# Patient Record
Sex: FEMALE | Race: WHITE
[De-identification: names, ages, dates, MRNs, and addresses within clinical notes are randomized per-mention and may not be internally consistent; named-entity substitution may affect disease eponyms.]

---

## 2017-01-28 ENCOUNTER — HOSPITAL ENCOUNTER (EMERGENCY)
Dept: HOSPITAL 75 - ER | Age: 42
Discharge: HOME | End: 2017-01-28
Payer: COMMERCIAL

## 2017-01-28 VITALS — DIASTOLIC BLOOD PRESSURE: 84 MMHG | SYSTOLIC BLOOD PRESSURE: 132 MMHG

## 2017-01-28 VITALS — BODY MASS INDEX: 41.65 KG/M2 | WEIGHT: 250 LBS | HEIGHT: 65 IN

## 2017-01-28 DIAGNOSIS — N13.2: Primary | ICD-10-CM

## 2017-01-28 DIAGNOSIS — N83.202: ICD-10-CM

## 2017-01-28 LAB
ALBUMIN SERPL-MCNC: 4 G/DL (ref 3.2–4.5)
ALT SERPL-CCNC: 22 U/L (ref 0–55)
ANION GAP SERPL CALC-SCNC: 13 MMOL/L (ref 5–14)
AST SERPL-CCNC: 17 U/L (ref 5–34)
BASOPHILS # BLD AUTO: 0.1 10^3/UL (ref 0–0.1)
BASOPHILS NFR BLD AUTO: 0 % (ref 0–10)
BASOPHILS NFR BLD MANUAL: 0 %
BILIRUB SERPL-MCNC: 0.3 MG/DL (ref 0.1–1)
BILIRUB UR QL STRIP: (no result)
BUN SERPL-MCNC: 12 MG/DL (ref 7–18)
BUN/CREAT SERPL: 11
CALCIUM SERPL-MCNC: 9.1 MG/DL (ref 8.5–10.1)
CAOX CRY #/AREA URNS LPF: (no result) /LPF
CHLORIDE SERPL-SCNC: 104 MMOL/L (ref 98–107)
CO2 SERPL-SCNC: 21 MMOL/L (ref 21–32)
CREAT SERPL-MCNC: 1.12 MG/DL (ref 0.6–1.3)
EOSINOPHIL # BLD AUTO: 0.2 10^3/UL (ref 0–0.3)
EOSINOPHIL NFR BLD AUTO: 1 % (ref 0–10)
EOSINOPHIL NFR BLD MANUAL: 1 %
ERYTHROCYTE [DISTWIDTH] IN BLOOD BY AUTOMATED COUNT: 14.4 % (ref 10–14.5)
GFR SERPLBLD BASED ON 1.73 SQ M-ARVRAT: 53 ML/MIN
GLUCOSE SERPL-MCNC: 109 MG/DL (ref 70–105)
HS C REACTIVE PROTEIN: 4.26 MG/DL (ref 0–0.5)
KETONES UR QL STRIP: (no result)
LEUKOCYTE ESTERASE UR QL STRIP: (no result)
LIPASE SERPL-CCNC: 14 U/L (ref 8–78)
LYMPHOCYTES # BLD AUTO: 2.5 X 10^3 (ref 1–4)
LYMPHOCYTES NFR BLD AUTO: 16 % (ref 12–44)
MCH RBC QN AUTO: 28 PG (ref 25–34)
MCHC RBC AUTO-ENTMCNC: 34 G/DL (ref 32–36)
MCV RBC AUTO: 82 FL (ref 80–99)
MONOCYTES # BLD AUTO: 1 X 10^3 (ref 0–1)
MONOCYTES NFR BLD AUTO: 7 % (ref 0–12)
NEUTROPHILS # BLD AUTO: 11.3 X 10^3 (ref 1.8–7.8)
NEUTROPHILS NFR BLD AUTO: 76 % (ref 42–75)
NEUTS BAND NFR BLD MANUAL: 70 %
NEUTS BAND NFR BLD: 0 %
NITRITE UR QL STRIP: NEGATIVE
PH UR STRIP: 6.5 [PH] (ref 5–9)
PLATELET # BLD: 381 10^3/UL (ref 130–400)
PMV BLD AUTO: 9.5 FL (ref 7.4–10.4)
POTASSIUM SERPL-SCNC: 3.8 MMOL/L (ref 3.6–5)
PROT SERPL-MCNC: 7.3 G/DL (ref 6.4–8.2)
PROT UR QL STRIP: (no result)
RBC # BLD AUTO: 4.55 10^6/UL (ref 4.35–5.85)
SODIUM SERPL-SCNC: 138 MMOL/L (ref 135–145)
SP GR UR STRIP: 1.02 (ref 1.02–1.02)
SQUAMOUS #/AREA URNS HPF: (no result) /HPF
UROBILINOGEN UR-MCNC: NORMAL MG/DL
VARIANT LYMPHS NFR BLD MANUAL: 25 %
WBC # BLD AUTO: 15 10^3/UL (ref 4.3–11)
WBC #/AREA URNS HPF: (no result) /HPF

## 2017-01-28 PROCEDURE — 85007 BL SMEAR W/DIFF WBC COUNT: CPT

## 2017-01-28 PROCEDURE — 96374 THER/PROPH/DIAG INJ IV PUSH: CPT

## 2017-01-28 PROCEDURE — 96361 HYDRATE IV INFUSION ADD-ON: CPT

## 2017-01-28 PROCEDURE — 74177 CT ABD & PELVIS W/CONTRAST: CPT

## 2017-01-28 PROCEDURE — 36415 COLL VENOUS BLD VENIPUNCTURE: CPT

## 2017-01-28 PROCEDURE — 85027 COMPLETE CBC AUTOMATED: CPT

## 2017-01-28 PROCEDURE — 86141 C-REACTIVE PROTEIN HS: CPT

## 2017-01-28 PROCEDURE — 87088 URINE BACTERIA CULTURE: CPT

## 2017-01-28 PROCEDURE — 81000 URINALYSIS NONAUTO W/SCOPE: CPT

## 2017-01-28 PROCEDURE — 96375 TX/PRO/DX INJ NEW DRUG ADDON: CPT

## 2017-01-28 PROCEDURE — 83690 ASSAY OF LIPASE: CPT

## 2017-01-28 PROCEDURE — 96376 TX/PRO/DX INJ SAME DRUG ADON: CPT

## 2017-01-28 PROCEDURE — 74000: CPT

## 2017-01-28 PROCEDURE — 80053 COMPREHEN METABOLIC PANEL: CPT

## 2017-01-28 PROCEDURE — 84703 CHORIONIC GONADOTROPIN ASSAY: CPT

## 2017-01-28 NOTE — DIAGNOSTIC IMAGING REPORT
INDICATION:  Left lower quadrant pain.



TECHNIQUE:  Single supine view of the abdomen 3:19 AM



CORRELATION STUDY:  None



FINDINGS:

There is nonvisualization of contrast within the right ureter;

however, contrast over the kidney demonstrates no significant

caliectasis. Left collecting system unremarkable. Urinary

bladder unremarkable. Overlying bowel gas pattern has a

nonobstructive appearance. Clips in the right upper quadrant

compatible with postcholecystectomy clips. Additional clip in

the right mid abdomen superimposed over the ascending colon.





IMPRESSION:

Nonvisualization of the right ureter on postcontrast imaging,

however, no significant caliectasis. Left collecting system

unremarkable. Mild severity fecal retention with

nonobstructive-appearing bowel gas pattern.



Dictated by:



Dictated on workstation # [id number]

## 2017-01-28 NOTE — ED ABDOMINAL PAIN
General


Chief Complaint:  -Female


Stated Complaint:  BLADDER PAIN


Nursing Triage Note:  


PELVIC PAIN RADIATING TO LEFT FLANK/BACK. WORSE TONIGHT


Sepsis Screen:  No Definite Risk


Source of Information:  Patient


Exam Limitations:  No Limitations





History of Present Illness


Time Seen By Provider:  00:22


Initial Comments


This 41-year-old woman presents to the emergency room with left-sided pelvic 

and flank pain that started about one month ago.  It was more mild in nature 

until the last few hours when it became severe.  She is in obvious distress.  

She has been on antibiotics for about one month for urinary tract infection she 

has also been on Hyophen for bladder spasms.  She denies any bowel problems.  

She has mild nausea without vomiting.  She has urinary frequency.  No fever.  

Her primary care provider is Dr. Soler.  She denies sexual activity.





Allergies and Home Medications


Allergies


Coded Allergies:  


     No Known Drug Allergies (Unverified , 3/1/11)





Home Medications


Ciprofloxacin HCl 250 Mg Tablet #28 1 CAP PO BID (Reported) 


Methenam/Me Blue/Ba/Salicy/Hyo 1 Each Tablet 1 EACH PO UD (Reported) 


Oxycodone HCl/Acetaminophen 1 Each Tablet #30 1-2 EACH PO Q6H PRN PRN PAIN 


   Prescribed by: SHANNAN WERNER on 1/28/17 0248


Phenazopyridine HCl 200 Mg Tablet #10 1 TAB PO TID PRN PRN PAIN 


   Prescribed by: SHANNAN WERNER on 1/28/17 0248





Review of Systems


Constitutional:   no symptoms reported


EENTM:   No Symptoms Reported


Respiratory:   No Symptoms Reported


Cardiovascular:   No Symptoms Reported


Gastrointestinal:   See HPI


Genitourinary:   See HPI


Musculoskeletal:   no symptoms reported


Skin:   no symptoms reported


Psychiatric/Neurological:   No Symptoms Reported


Endocrine:   No Symptoms Reported





Past Medical-Social-Family Hx


Patient Social History


Alcohol Use:  Occasionally Uses


Recreational Drug Use:  No


Smoking Status:  Never a Smoker


Recent Foreign Travel:  No


Contact w/Someone Who Travel:  No


Recent Infectious Disease Expo:  No


Recent Hopitalizations:  No


Physical Abuse Screen:  No


Sexual Abuse:  No





Immunizations Up To Date


Tetanus Booster (TDap):  Unknown





Seasonal Allergies


Seasonal Allergies:  No





Surgeries


HX Surgeries:  Yes (BACK)


Surgeries:  Gallbladder, Orthopedic





Respiratory


Hx Respiratory Disorders:  No





Cardiovascular


Hx Cardiac Disorders:  No





Neurological


Hx Neurological Disorders:  No





Reproductive System


Pregnant:  No


Hx Reproductive Disorders:  No





Genitourinary


Hx Genitourinary Disorders:  No





Gastrointestinal


Hx Gastrointestinal Disorders:  No





Musculoskeletal


Hx Musculoskeletal Disorders:  No





Endocrine


Hx Endocrine Disorders:  Yes (obesity)





HEENT


HX ENT Disorders:  No





Cancer


Hx Cancer:  No





Psychosocial


Hx Psychiatric Problems:  No





Integumentary


HX Skin/Integumentary Disorder:  No





Blood Transfusions


Hx Blood Disorders:  No





Family Medical History


Significant Family History:  COPD, Hypertension





Physical Exam


Vital Signs





 VS - Last 72 Hours, by Label








 1/28/17





 00:29


 


Temp 99.7


 


Pulse 115


 


Resp 24


 


B/P 160/96


 


Pulse Ox 99


 


O2 Delivery Room Air





Capillary Refill : Less Than 3 Seconds


General Appearance:   WD/WN moderate distress


HEENT:   PERRL/EOMI normal ENT inspection


Neck:   normal inspection


Respiratory:   lungs clear normal breath sounds no respiratory distress no 

accessory muscle use


Cardiovascular:   no edema no murmur tachycardia


Gastrointestinal:   normal bowel sounds soft tenderness (throughout the left 

abdomen)


Extremities:   normal inspection no pedal edema


Back:   normal inspection no CVA tenderness


Neurologic/Psychiatric:   CNs II-XII nml as tested no motor/sensory deficits 

alert normal mood/affect oriented x 3


Skin:   normal color warm/dry





Progress/Results/Core Measures


Results/Orders


Lab Results





 Laboratory Tests








Test


  1/28/17


00:25 1/28/17


01:07 Range/Units


 


 


Urine Bacteria TRACE    /HPF


 


Urine Bilirubin 1+ H  NEGATIVE  


 


Urine Calcium Oxalate Crystals MODERATE H   /LPF


 


Urine Casts NONE    /LPF


 


Urine Clarity CLEAR    


 


Urine Color OTHER H   


 


Urine Crystals PRESENT H   /LPF


 


Urine Culture Indicated YES    


 


Urine Glucose (UA) NEGATIVE   NEGATIVE  


 


Urine Ketones 1+ H  NEGATIVE  


 


Urine Leukocyte Esterase 1+ H  NEGATIVE  


 


Urine Mucus NEGATIVE    /LPF


 


Urine Nitrite NEGATIVE   NEGATIVE  


 


Urine Protein 2+ H  NEGATIVE  


 


Urine RBC NONE    /HPF


 


Urine RBC (Auto) 2+ H  NEGATIVE  


 


Urine Specific Gravity 1.025 H  1.016-1.022  


 


Urine Squamous Epithelial


Cells 5-10 


  


   /HPF


 


 


Urine Urobilinogen NORMAL   NORMAL  MG/DL


 


Urine WBC NONE    /HPF


 


Urine pH 6.5   5-9  


 


Alanine Aminotransferase


(ALT/SGPT) 


  22 


  0-55  U/L


 


 


Albumin  4.0  3.2-4.5  G/DL


 


Alkaline Phosphatase  76    U/L


 


Anion Gap  13  5-14  MMOL/L


 


Aspartate Amino Transf


(AST/SGOT) 


  17 


  5-34  U/L


 


 


BUN/Creatinine Ratio  11   


 


Band Neutrophils  0   %


 


Basophils # (Auto)


  


  0.1 


  0.0-0.1


10^3/uL


 


Basophils % (Manual)  0   %


 


Basophils (%) (Auto)  0  0-10  %


 


Blood Morphology Comment  NORMAL   


 


Blood Urea Nitrogen  12  7-18  MG/DL


 


C-Reactive Protein High


Sensitivity 


  4.26 H


  0.00-0.50


MG/DL


 


Calcium Level  9.1  8.5-10.1  MG/DL


 


Carbon Dioxide Level  21  21-32  MMOL/L


 


Chloride Level  104    MMOL/L


 


Creatinine


  


  1.12 


  0.60-1.30


MG/DL


 


Eosinophils # (Auto)


  


  0.2 


  0.0-0.3


10^3/uL


 


Eosinophils % (Manual)  1   %


 


Eosinophils (%) (Auto)  1  0-10  %


 


Estimat Glomerular Filtration


Rate 


  53 


   


 


 


Glucose Level  109 H   MG/DL


 


Hematocrit  37  35-52  %


 


Hemoglobin  12.7  11.5-16.0  G/DL


 


Lipase  14  8-78  U/L


 


Lymphocytes # (Auto)  2.5  1.0-4.0  X 10^3


 


Lymphocytes % (Manual)  25   %


 


Lymphocytes (%) (Auto)  16  12-44  %


 


Mean Corpuscular Hemoglobin  28  25-34  PG


 


Mean Corpuscular Hemoglobin


Concent 


  34 


  32-36  G/DL


 


 


Mean Corpuscular Volume  82  80-99  FL


 


Mean Platelet Volume  9.5  7.4-10.4  FL


 


Monocytes # (Auto)  1.0  0.0-1.0  X 10^3


 


Monocytes % (Manual)  4   %


 


Monocytes (%) (Auto)  7  0-12  %


 


Neutrophils # (Auto)  11.3 H 1.8-7.8  X 10^3


 


Neutrophils % (Manual)  70   %


 


Neutrophils (%) (Auto)  76 H 42-75  %


 


Platelet Count


  


  381 


  130-400


10^3/uL


 


Potassium Level  3.8  3.6-5.0  MMOL/L


 


Red Blood Count


  


  4.55 


  4.35-5.85


10^6/uL


 


Red Cell Distribution Width  14.4  10.0-14.5  %


 


Sodium Level  138  135-145  MMOL/L


 


Total Bilirubin  0.3  0.1-1.0  MG/DL


 


Total Protein  7.3  6.4-8.2  G/DL


 


White Blood Count


  


  15.0 H


  4.3-11.0


10^3/uL








My Orders





 Orders-SHANNAN DAVID MD


Ua Culture If Indicated (1/28/17 00:22)


Urine Culture (1/28/17 00:25)


Cbc With Automated Diff (1/28/17 00:53)


Comprehensive Metabolic Panel (1/28/17 00:53)


Hs C Reactive Protein (1/28/17 00:53)


Lipase (1/28/17 00:53)


Saline Lock/Iv-Start (1/28/17 00:53)


Ns Iv 1000 Ml (Sodium Chloride 0.9%) (1/28/17 00:53)


Fentanyl  Injection (Sublimaze Injection (1/28/17 01:00)


Ondansetron Injection (Zofran Injectio (1/28/17 01:00)


Ct Abdomen/Pelvis W (1/28/17 00:57)


Hydromorphone Injection (Dilaudid Inject (1/28/17 01:09)


Manual Differential (1/28/17 01:07)


Iohexol Injection (Omnipaque 350 Mg/Ml 1 (1/28/17 01:45)


Ns (Ivpb) (Sodium Chloride 0.9% Ivpb Bag (1/28/17 01:45)


Ketorolac Injection (Toradol Injection) (1/28/17 02:00)


Phenazopyridine Tablet (Pyridium Tablet) (1/28/17 02:45)


Oxycodone/Apap 5/325mg Tablet (Percocet (1/28/17 02:45)


Abdomen/Kub 1view (1/28/17 02:49)





Medications Given in ED





 Current Medications








 Medications  Dose


 Ordered  Sig/Quoc


 Route  Start Time


 Stop Time Status Last Admin


Dose Admin


 


 Fentanyl Citrate  100 mcg  ONCE  ONCE


 IVP  1/28/17 01:00


 1/28/17 01:01 DC 1/28/17 01:05


100 MCG


 


 Ketorolac


 Tromethamine  30 mg  ONCE  ONCE


 IVP  1/28/17 02:00


 1/28/17 02:01 DC 1/28/17 02:00


30 MG


 


 Ondansetron HCl  4 mg  ONCE  ONCE


 IVP  1/28/17 01:00


 1/28/17 01:01 DC 1/28/17 01:05


4 MG


 


 Oxycodone/


 Acetaminophen  1 tab  ONCE  ONCE


 PO  1/28/17 02:45


 1/28/17 02:46 DC 1/28/17 02:53


1 TAB


 


 Phenazopyridine


 HCl  200 mg  ONCE  ONCE


 PO  1/28/17 02:45


 1/28/17 02:46 DC 1/28/17 02:52


200 MG


 


 Sodium Chloride  1,000 ml @ 


 0 mls/hr  Q0M ONCE


 IV  1/28/17 00:53


 1/28/17 00:56 DC 1/28/17 01:05


0 MLS/HR








Vital Signs/I&O





 Vital Sign - Last 12Hours








 1/28/17





 00:29


 


Temp 99.7


 


Pulse 115


 


Resp 24


 


B/P 160/96


 


Pulse Ox 99


 


O2 Delivery Room Air








Blood Pressure Mean:  117





Point of Care Testing


Urine Pregnancy-Bedside:  Negative


Progress Note #1:  


   Time:  01:37


Progress Note


Patient is now on CT for evaluation of her abdominal pain.  She was initially 

treated with fentanyl 100 g which was insufficient for treatment of her pain.  

She was still in distress from pain.  Dilaudid 1 mg was added.  Zofran was 

administered for nausea.  IV fluids are infusing.


Progress Note #2:  


   Time:  03:02


Progress Note


Patient was found to have a small left distal ureteral stone by CT scan.  

Toradol was added to her pain management and she has comfortable.  A ring-

enhancing ovarian cyst was also noted on the left.  Patient is aware of the 

cyst and she was encouraged to follow up with her primary care provider to 

monitor stability.  Patient was given Percocet and Pyridium prior to dismissal 

to help her get through the night.  A strainer was provided.  She already has 

an appointment with Dr. Tawil for Tuesday.





Diagnostic Imaging





   Diagonstic Imaging:  CT


   Plain Films/CT/US/NM/MRI:  abdomen, pelvis


Comments


CT abdomen and pelvis viewed by me and stat rad report reviewed.  There is a 

small distal left ureteral stone measuring approximately 3 x 4 mm with 

associated left hydroureter and hydronephrosis.  Ring-enhancing left ovarian 

cyst was also noted.





Departure


Impression


Impression:  


 Primary Impression:  


 Left ureteral stone


 Additional Impressions:  


 Left sided abdominal pain


 Ovarian cyst, left


Disposition:  01 HOME, SELF-CARE


Condition:  Improved





Departure-Patient Inst.


Decision time for Depature:  02:40


Referrals:  


KIRSTIE SOLER DO (PCP/Family)


Primary Care Physician


Patient Instructions:  Kidney Stones in Adults





Add. Discharge Instructions:


Complete your antibiotics as prescribed.  Drink plenty of clear liquids.  You 

may discontinue Hyophen.  Use Percocet and peridium as prescribed.  Keep your 

appointment with Dr. Tawil.  Return to the emergency room if symptoms worsen.  

Please follow-up with your primary care provider or women's health provider 

regarding your ovarian cyst.  It needs to be monitored for stability.




















All discharge instructions reviewed with patient and/or family. Voiced 

understanding.


Scripts


Phenazopyridine HCl (Pyridium)200 Mg Tablet1 Tab PO TID PRN PAIN #10 TAB


   Prov:SHANNAN DAVID MD         1/28/17


Oxycodone HCl/Acetaminophen (Percocet 5-325 mg Tablet)1 Each Tablet1-2 Each PO 

Q6H PRN PAIN #30 TAB


   Prov:SHANNAN DAVID MD         1/28/17





Copy


Copies To 1:   TAWIL,ELIAS A MD


Copies To 2:   KIRSTIE SOLER JOSHUA T MD Jan 28, 2017 01:36

## 2017-02-09 ENCOUNTER — HOSPITAL ENCOUNTER (EMERGENCY)
Dept: HOSPITAL 75 - ER | Age: 42
Discharge: HOME | End: 2017-02-09
Payer: COMMERCIAL

## 2017-02-09 ENCOUNTER — HOSPITAL ENCOUNTER (OUTPATIENT)
Dept: HOSPITAL 75 - PREOP | Age: 42
Discharge: HOME | End: 2017-02-09
Attending: UROLOGY
Payer: COMMERCIAL

## 2017-02-09 VITALS — WEIGHT: 250 LBS | BODY MASS INDEX: 41.65 KG/M2 | HEIGHT: 65 IN

## 2017-02-09 VITALS — HEIGHT: 65 IN | BODY MASS INDEX: 48.48 KG/M2 | WEIGHT: 291 LBS

## 2017-02-09 VITALS — DIASTOLIC BLOOD PRESSURE: 66 MMHG | SYSTOLIC BLOOD PRESSURE: 119 MMHG

## 2017-02-09 VITALS — DIASTOLIC BLOOD PRESSURE: 68 MMHG | SYSTOLIC BLOOD PRESSURE: 125 MMHG

## 2017-02-09 DIAGNOSIS — N20.1: ICD-10-CM

## 2017-02-09 DIAGNOSIS — Z01.812: Primary | ICD-10-CM

## 2017-02-09 DIAGNOSIS — D25.9: ICD-10-CM

## 2017-02-09 DIAGNOSIS — N20.1: Primary | ICD-10-CM

## 2017-02-09 DIAGNOSIS — Z11.2: ICD-10-CM

## 2017-02-09 DIAGNOSIS — N83.202: ICD-10-CM

## 2017-02-09 LAB
ALBUMIN SERPL-MCNC: 3.9 G/DL (ref 3.2–4.5)
ALT SERPL-CCNC: 30 U/L (ref 0–55)
ANION GAP SERPL CALC-SCNC: 8 MMOL/L (ref 5–14)
AST SERPL-CCNC: 18 U/L (ref 5–34)
BASOPHILS # BLD AUTO: 0.1 10^3/UL (ref 0–0.1)
BASOPHILS NFR BLD AUTO: 1 % (ref 0–10)
BILIRUB SERPL-MCNC: 0.4 MG/DL (ref 0.1–1)
BILIRUB UR QL STRIP: NEGATIVE
BUN SERPL-MCNC: 10 MG/DL (ref 7–18)
BUN/CREAT SERPL: 10
CALCIUM SERPL-MCNC: 8.8 MG/DL (ref 8.5–10.1)
CHLORIDE SERPL-SCNC: 105 MMOL/L (ref 98–107)
CO2 SERPL-SCNC: 24 MMOL/L (ref 21–32)
CREAT SERPL-MCNC: 1 MG/DL (ref 0.6–1.3)
EOSINOPHIL # BLD AUTO: 0.1 10^3/UL (ref 0–0.3)
EOSINOPHIL NFR BLD AUTO: 1 % (ref 0–10)
ERYTHROCYTE [DISTWIDTH] IN BLOOD BY AUTOMATED COUNT: 14.5 % (ref 10–14.5)
GFR SERPLBLD BASED ON 1.73 SQ M-ARVRAT: > 60 ML/MIN
GLUCOSE SERPL-MCNC: 112 MG/DL (ref 70–105)
KETONES UR QL STRIP: NEGATIVE
LEUKOCYTE ESTERASE UR QL STRIP: (no result)
LYMPHOCYTES # BLD AUTO: 2.2 X 10^3 (ref 1–4)
LYMPHOCYTES NFR BLD AUTO: 22 % (ref 12–44)
MCH RBC QN AUTO: 28 PG (ref 25–34)
MCHC RBC AUTO-ENTMCNC: 34 G/DL (ref 32–36)
MCV RBC AUTO: 82 FL (ref 80–99)
MONOCYTES # BLD AUTO: 0.8 X 10^3 (ref 0–1)
MONOCYTES NFR BLD AUTO: 8 % (ref 0–12)
NEUTROPHILS # BLD AUTO: 6.9 X 10^3 (ref 1.8–7.8)
NEUTROPHILS NFR BLD AUTO: 68 % (ref 42–75)
NITRITE UR QL STRIP: NEGATIVE
PH UR STRIP: 5 [PH] (ref 5–9)
PHOSPHATE SERPL-MCNC: 3.1 MG/DL (ref 2.3–4.7)
PLATELET # BLD: 344 10^3/UL (ref 130–400)
PMV BLD AUTO: 9.1 FL (ref 7.4–10.4)
POTASSIUM SERPL-SCNC: 3.7 MMOL/L (ref 3.6–5)
PROT SERPL-MCNC: 7.3 G/DL (ref 6.4–8.2)
PROT UR QL STRIP: (no result)
RBC # BLD AUTO: 4.55 10^6/UL (ref 4.35–5.85)
SODIUM SERPL-SCNC: 137 MMOL/L (ref 135–145)
SP GR UR STRIP: 1.02 (ref 1.02–1.02)
SQUAMOUS #/AREA URNS HPF: (no result) /HPF
URATE SERPL-MCNC: 6.5 MG/DL (ref 2.6–7.2)
UROBILINOGEN UR-MCNC: NORMAL MG/DL
WBC # BLD AUTO: 10.1 10^3/UL (ref 4.3–11)
WBC #/AREA URNS HPF: (no result) /HPF

## 2017-02-09 PROCEDURE — 84100 ASSAY OF PHOSPHORUS: CPT

## 2017-02-09 PROCEDURE — 87088 URINE BACTERIA CULTURE: CPT

## 2017-02-09 PROCEDURE — 81000 URINALYSIS NONAUTO W/SCOPE: CPT

## 2017-02-09 PROCEDURE — 83970 ASSAY OF PARATHORMONE: CPT

## 2017-02-09 PROCEDURE — 84550 ASSAY OF BLOOD/URIC ACID: CPT

## 2017-02-09 PROCEDURE — 36415 COLL VENOUS BLD VENIPUNCTURE: CPT

## 2017-02-09 PROCEDURE — 74000: CPT

## 2017-02-09 PROCEDURE — 87081 CULTURE SCREEN ONLY: CPT

## 2017-02-09 PROCEDURE — 96365 THER/PROPH/DIAG IV INF INIT: CPT

## 2017-02-09 PROCEDURE — 76830 TRANSVAGINAL US NON-OB: CPT

## 2017-02-09 PROCEDURE — 76775 US EXAM ABDO BACK WALL LIM: CPT

## 2017-02-09 PROCEDURE — 80053 COMPREHEN METABOLIC PANEL: CPT

## 2017-02-09 PROCEDURE — 85025 COMPLETE CBC W/AUTO DIFF WBC: CPT

## 2017-02-09 PROCEDURE — 96361 HYDRATE IV INFUSION ADD-ON: CPT

## 2017-02-09 PROCEDURE — 96375 TX/PRO/DX INJ NEW DRUG ADDON: CPT

## 2017-02-09 NOTE — DIAGNOSTIC IMAGING REPORT
Clinical indication: Patient with left pelvic pain and constant

burning.



Exam: Transvaginal ultrasound pelvis.



Comparison: Pelvic ultrasound dated 9/15/2011. CT scan abdomen

and pelvis performed with IV contrast dated 1/28/2017.



FINDINGS:

There is a roughly 1.9 cm x 1.9 cm x 2.0 cm slightly

heterogeneous hypoechoic mass involving the anterior aspect of

the uterine fundus likely representing a fibroid. This was not

seen on comparison pelvic ultrasound. Endometrial stripe

measures 7 mm.



The right ovary is not able to be visualized on this exam. Left

ovary is within normal limits for size measuring 3.4 cm x 4.2 cm

x 3.9 cm. There is no significant cystic changes seen. Doppler

flow is difficult to evaluate within the left ovary region.

Otherwise, the uterus has normal configuration, echogenicity,

size, and shape and measures 8.9 cm x 5.4 cm x 4.7 cm.



There is no significant free fluid in the pelvis. This case was

discussed with ultrasound technologist who spoke with the

performing ultrasound technologist after the exam.



Impression:

1: Unable to obtain Doppler flow within the left ovary, but

ovary is just within normal limits for size. There is no

significant free fluid in the pelvis. This is nonspecific and

early left ovarian torsion cannot be completely excluded.

Clinical correlation for patient's symptoms is suggested.

Short-term repeat pelvic ultrasound in 12-24 hours may help

better evaluate for interval changes.



2: The right ovary was unable to be visualized and cannot be

evaluated.



3: There is interval development of a 2.0 cm uterine fibroid.



Results of this report was discussed with Dr. David Garcia

via the telephone on 2/9/2017 at 0930 hrs.



Dictated by:



Dictated on workstation # [id number]

## 2017-02-09 NOTE — DIAGNOSTIC IMAGING REPORT
Clinical indication: Patient with left flank pain.



Exam: Limited renal ultrasound with focus on the left kidney.



Comparison: CT scan of the abdomen and pelvis performed with IV

contrast dated 1/28/2017.



Findings:

Again seen mild left hydronephrosis. Otherwise left kidney has

normal cortical thickness, echogenicity, size, and shape. The

right kidney measures 13.1 cm in craniocaudal dimension. The

bladder is predominantly decompressed and unable to be

appropriately evaluated. Single image of right kidney shows no

significant abnormality as visualized.



Impression:

1: Again seen mild left hydronephrosis. Patient was noted to

have a stone in the distal left ureter on the comparison CT

scan.



2:  Bladder is decompressed and cannot be appropriately

evaluated on this exam.



This case was discussed with Dr. David Garcia via the

telephone on 2/9/2017 at 0930 hrs.



Dictated by:



Dictated on workstation # [id number]

## 2017-02-09 NOTE — XMS REPORT
Continuity of Care Document

 Created on: 2017



[facility]

External Reference #: [id number]

: 1975

Sex: Female



Demographics







 Address  [address] 

[place]  [address]

 

 Home Phone  [phone]

 

 Preferred Language  English

 

 Marital Status  Unknown

 

 Yazidi Affiliation  Unknown

 

 Race  Unknown

 

 Ethnic Group  Unknown





Author







 Author  [facility]

 

 Organization  [facility]

 

 Address  Unknown

 

 Phone  Unavailable







Support







 Name  Relationship  Address  Phone

 

 SHANNAN DAVID MD  Caregiver  [address]

[place]  [address] [phone]

 

 KIRSTIE CASTANEDA DO  Caregiver  [address]

[place]  [address] [phone]

 

 [facility]  Next Of Kin  [address]

[place]  [address] [phone]







Care Team Providers







 Care Team Member Name  Role  Phone

 

 KIRSTIE CASTANEDA DO  PCP  [phone]







Insurance Providers







 Payer Name  Policy Number  Subscriber Name  Relationship

 

 [employer]  [id number]  [place] Self / Same As Patient







Advance Directives







 Directive  Response  Recorded Date/Time

 

 Advance Directives  No  17 12:29am

 

 Resuscitation Status  Full Code  17 12:29am







Chief Complaint and Reason for Visit







 Chief Complaint  -Female

 

 Reason for Visit  [id number]

Left sided abdominal pain

[id number]







Problems

Active Problems







 Medical Problem  Onset Date  Status

 

 Left sided abdominal pain  Unknown  Acute

 

 Left ureteral stone  Unknown  Acute

 

 Ovarian cyst, left  Unknown  Acute







Medications

Current Home Medications







 Medication  Dose  Units  Route  Directions  Days/Qty  Instructions  Start Date

 

 Ciprofloxacin Hcl 250 Mg  1  Cap  Oral  Twice A Day  17

 

 Met[place]am/Me Blue/Ba/Salicy/Hyo 1 Each  1  Each  Oral  As Directed        

 

 Oxycodone Hcl/Acetaminophen 1 Each  1-2  Each  Oral  Every 6 Hours as needed 
for Pain  30     17

 

 Phenazopyridine Hcl 200 Mg  1  Tab  Oral  Three Times A Day as needed for Pain
  10     17





Past Home Medications







 Medication  Directions  Ordered  Status

 

 Oxycodone Hcl/Acetaminophen 1 Each Tablet, 1 Each Oral  Every 4HRS  11  
Discontinued

 

 Omeprazole 20 Mg Tablet.dr, 20 Mg Oral  Twice A Day  11  Discontinued

 

 Famotidine (Pepcid) 20 Mg Tablet, 1 Each Oral  Twice A Day  11  
Discontinued







Social History







 Social History Problem  Response  Recorded Date/Time

 

 Alcohol Use  Occasionally Uses  2017 12:29am

 

 Recreational Drug Use  No  2017 12:29am

 

 Recent Foreign Travel  No  2017 12:29am

 

 Recent Infectious Disease Exposure  No  2017 12:29am

 

 Smoking Status  Never a Smoker  2017 12:29am

 

 Recent Hopitalizations  No  2017 12:29am









 Query  Response  Start Date  Stop Date

 

 Smoking Status  Never a Smoker      







Hospital Discharge Instructions

No hospital discharge instructions.



Plan of Care







 Discharge Date  17 3:07am

 

 Disposition  01 HOME, SELF-CARE

 

 Condition at Discharge  Improved

 

 Instructions/Education Provided  Kidney Stones in Adults

 

 Prescriptions  See Medication Section

 

 Referrals  KIRSTIE CASTANEDA DO - Primary Care Physician

 

 Additional Instructions/Education  Complete your antibiotics as prescribed.  
Drink plenty of clear 

liquids.  You may discontinue Hyophen.  Use Percocet and peridium 

as prescribed.  Keep your appointment with Dr. Tawil.  Return to 

the emergency room if symptoms worsen.  Please follow-up with 

your primary care provider or women's health provider regarding 

your ovarian cyst.  It needs to be monitored for stability.  

Strain your urine and present any stones collected at your 

follow-up appointment.  

  

  

  

  

  

  

All discharge instructions reviewed with patient and/or family. 

Voiced understanding.







Functional Status

No functional status results.



Allergies, Adverse Reactions, Alerts

No known allergies.



Immunizations

No immunization records.



Vital Signs

Acute Vital Signs





 Vital  Response  Date/Time

 

 Temperature (Fahrenheit)  98.1 degrees F (97.6 - 99.5)  2017 3:03am

 

 Temperature (Calculated Celsius)  36.99934 degrees C (36.4 - 37.5)  2017 
3:03am

 

 Temperature Source  Temporal  2017 3:03am

 

 Pulse Rate (adult)  120 bpm (60 - 90)  2017 3:03am

 

 Respiratory Rate  16 bpm (12 - 24)  2017 3:03am

 

 O2 Sat by Pulse Oximetry  95 % (88 - 100)  2017 3:03am

 

 Blood Pressure  132/84 mm Hg  2017 3:03am

 

    Blood Pressure Mean  117 mm Hg  2017 12:29am

 

 Pain      

 

    Numeric Pain Scale  2  2017 3:03am

 

 Height (Feet)  5 feet  2017 12:29am

 

 Height (Inches)  5 inches  2017 12:29am

 

 Height (Calculated Centimeters)  165.824218 cm  2017 12:29am

 

 Weight (Pounds)  250 pounds  2017 12:29am

 

 Weight (Calculated Kilograms)  113.285248 kilograms  2017 12:29am

 

 Capillary Refill      

 

    Capillary Refill  Less Than 3 Seconds  2017 12:29am

 

 Height  5 ft 5 in   

 

 Weight  250 lb   

 

 Body Mass Index  41.6 kg/m^2   







Results

Laboratory Results







 Test Name  Result  Units  Flags  Reference  Collection Date/Time  Result Date/
Time  Comments

 

 White Blood Count  15.0  10^3/uL  H  4.3-11.0  2017 1:072017 1:
22am   

 

 Red Blood Count  4.55  10^6/uL     4.35-5.85  2017 1:072017 1:
22am   

 

 Hemoglobin  12.7  G/DL     11.5-16.0  2017 1:072017 1:22am   

 

 Hematocrit  37  %     35-52  2017 1:072017 1:22am   

 

 Mean Corpuscular Volume  82  FL     80-99  2017 1:072017 1:
22am   

 

 Mean Corpuscular Hemoglobin  28  PG     25-34  2017 1:07am  2017 1:
22am   

 

 Mean Corpuscular Hemoglobin Concent  34  G/DL     32-36  2017 1:07 1:22am   

 

 Red Cell Distribution Width  14.4  %     10.0-14.5  2017 1:072017 1:22am   

 

 Platelet Count  381  10^3/uL     130-400  2017 1:072017 1:22am
   

 

 Mean Platelet Volume  9.5  FL     7.4-10.4  2017 1:07am  2017 1:
22am   

 

 Neutrophils (%) (Auto)  76  %  H  42-75  2017 1:072017 1:22am 
  

 

 Lymphocytes (%) (Auto)  16  %     12-44  2017 1:072017 1:22am 
  

 

 Monocytes (%) (Auto)  7  %     0-12  2017 1:072017 1:22am   

 

 Eosinophils (%) (Auto)  1  %     0-10  2017 1:072017 1:22am   

 

 Basophils (%) (Auto)  0  %     0-10  2017 1:072017 1:22am   

 

 Neutrophils # (Auto)  11.3  X 10^3  H  1.8-7.8  2017 1:072017 1
:22am   

 

 Lymphocytes # (Auto)  2.5  X 10^3     1.0-4.0  2017 1:072017 1:
22am   

 

 Monocytes # (Auto)  1.0  X 10^3     0.0-1.0  2017 1:072017 1:
22am   

 

 Eosinophils # (Auto)  0.2  10^3/uL     0.0-0.3  2017 1:07am  2017 1
:22am   

 

 Basophils # (Auto)  0.1  10^3/uL     0.0-0.1  2017 1:072017 1:
22am   

 

 Neutrophils % (Manual)  70  %        2017 1:072017 1:36am   

 

 Band Neutrophils  0  %        2017 1:072017 1:36am   

 

 Lymphocytes % (Manual)  25  %        2017 1:07am  2017 1:36am   

 

 Monocytes % (Manual)  4  %        2017 1:07am  2017 1:36am   

 

 Eosinophils % (Manual)  1  %        2017 1:07am  2017 1:36am   

 

 Basophils % (Manual)  0  %        2017 1:07am  2017 1:36am   

 

 Blood Morphology Comment  NORMAL           2017 1:072017 1:
36am   

 

 Urine Color  OTHER     *     2017 12:25am  2017 12:50am  SPECIMEN 
IS BLUE/TEAL IN COLOR WHICH MAY CAUSE COLOR 

INTERFERENCE WITH DIPSTICK ANALYSIS.

 

 Urine Clarity  CLEAR           2017 12:25am  2017 12:50am   

 

 Urine pH  6.5        5-9  2017 12:25am  2017 12:50am   

 

 Urine Specific Gravity  1.025     *  1.016-1.022  2017 12:25am  2017 12:50am   

 

 Urine Protein  2+     *  NEGATIVE  2017 12:25am  2017 12:50am   

 

 Urine Glucose (UA)  NEGATIVE        NEGATIVE  2017 12:25am  2017 12
:50am   

 

 Urine RBC (Auto)  2+     *  NEGATIVE  2017 12:25am  2017 12:50am   

 

 Urine Ketones  1+     *  NEGATIVE  2017 12:25am  2017 12:50am   

 

 Urine Nitrite  NEGATIVE        NEGATIVE  2017 12:25am  2017 12:
50am   

 

 Urine Bilirubin  1+     *  NEGATIVE  2017 12:25am  2017 12:50am  
CONFIRMATORY ICTOTEST IS NEGATIVE.

 

 Urine Urobilinogen  NORMAL  MG/DL     NORMAL  2017 12:25am  2017 12
:50am   

 

 Urine Leukocyte Esterase  1+     *  NEGATIVE  2017 12:25am  2017 12
:50am   

 

 Urine RBC  NONE  /HPF        2017 12:25am  2017 12:50am  
MICROSCOPIC ANALYSIS PERFORMED ON 1 ML OF UNSPUN SPECIMEN 

DUE TO LOW VOLUME.

 

 Urine WBC  NONE  /HPF        2017 12:25am  2017 12:50am   

 

 Urine Bacteria  TRACE  /HPF        2017 12:25am  2017 12:50am   

 

 Urine Squamous Epithelial Cells  5-10  /HPF        2017 12:25am  2017 12:50am   

 

 Urine Crystals  PRESENT  /LPF  *     2017 12:25am  2017 12:50am   

 

 Urine Calcium Oxalate Crystals  MODERATE  /LPF  *     2017 12:25am   12:50am   

 

 Urine Casts  NONE  /LPF        2017 12:25am  2017 12:50am   

 

 Urine Mucus  NEGATIVE  /LPF        2017 12:2017 12:50am   

 

 Urine Culture Indicated  YES           2017 12:252017 12:50am 
  

 

 Sodium Level  138  MMOL/L     135-145  2017 1:2017 1:36am   

 

 Potassium Level  3.8  MMOL/L     3.6-5.0  2017 1:2017 1:36am
   

 

 Chloride Level  104  MMOL/L       2017 1:2017 1:36am   

 

 Carbon Dioxide Level  21  MMOL/L     21-32  2017 1:2017 1:
36am   

 

 Anion Gap  13  MMOL/L     5-14  2017 1:2017 1:36am   

 

 Blood Urea Nitrogen  12  MG/DL     7-18  2017 1:072017 1:36am 
  

 

 Creatinine  1.12  MG/DL     0.60-1.30  2017 1:2017 1:36am   

 

 BUN/Creatinine Ratio  11           2017 1:2017 1:36am   

 

 Estimat Glomerular Filtration Rate  53           2017 1:2017 
1:36am                    GFR INTERPRETIVE DATA  

  

         UNITS FOR ESTIMATED GFR (eGFR): mL/min/1.73 M2  

         REFERENCE RANGE FOR ESTIMATED GFR (eGFR)  

                                          eGFR  

         NORMAL eGFR                       >60  

         MODERATELY DECREASED eGFR          30-59  

         SEVERLY DECREASED eGFR             15-29  

         KIDNEY FAILURE                    <15 (OR DIALYSIS)  

 

 Glucose Level  109  MG/DL  H    2017 1:2017 1:36am   

 

 Calcium Level  9.1  MG/DL     8.5-10.1  2017 1:2017 1:36am   

 

 Total Bilirubin  0.3  MG/DL     0.1-1.0  2017 1:2017 1:36am 
  

 

 Alkaline Phosphatase  76  U/L       2017 1:2017 1:36am
   

 

 Aspartate Amino Transf (AST/SGOT)  17  U/L     5-34  2017 1:2017 1:36am   

 

 Alanine Aminotransferase (ALT/SGPT)  22  U/L     0-55  2017 1:07am   1:36am   

 

 Total Protein  7.3  G/DL     6.4-8.2  2017 1:07am  2017 1:36am   

 

 Albumin  4.0  G/DL     3.2-4.5  2017 1:07am  2017 1:36am   

 

 Lipase  14  U/L     8-78  2017 1:07am  2017 1:36am   

 

 C-Reactive Protein High Sensitivity  4.26  MG/DL  H  0.00-0.50  2017 1:
07am  2017 1:36am   







Procedures

No known history of procedures.



Encounters







 Encounter  Location  Arrival/Admit Date  Discharge/Depart Date  Attending 
Provider

 

 Departed Emergency Room  [facility]  17 12:17am   3:07am  SHANNAN DAVID MD











 Recent Diagnosis

## 2017-02-09 NOTE — ED GU-FEMALE
General


Chief Complaint:  Abdominal/GI Problems


Stated Complaint:  L FLANK PAIN


Nursing Triage Note:  


PT STATES SHE HAS A KIDNEY STONE, CC OF LT FLANK PAIN RADIATING TO THE GROIN.


Nursing Sepsis Screen:  No Definite Risk


Source:  patient


Exam Limitations:  no limitations





History of Present Illness


Time seen by provider:  07:43


Initial Comments


Here with report of left flank pain and has history of kidney stone.  She 

states the pain radiates to the groin.  Noted some blood in her urine today.  

Kidney stone noted on CT exam on .  She is due to have follow-up with 

urologist on 17 but had influenza and was unable to follow-up that today 

but did follow-up on 17.  She is due to have procedure stone doesn't clear 

next week.  She had another attack of the pain today that was associated with a 

blood.  It was not related to Percocet so she presented here for further 

evaluation per instructions from the urologist.


Timing/Duration:  this morning


Severity/Quality:  severe, aching, sharp


Location:  left flank


Radiation:  groin


Activities at Onset:  none


Associated Symptoms:  No fever/chills,  lower back pain nausea/vomiting





Allergies and Home Medications


Allergies


Coded Allergies:  


     No Known Drug Allergies (Unverified , 3/1/11)





Home Medications


Ciprofloxacin HCl 250 Mg Tablet #28 1 CAP PO BID (Reported) 


Methenam/Me Blue/Ba/Salicy/Hyo 1 Each Tablet 1 EACH PO UD (Reported) 


Oxycodone HCl/Acetaminophen 1 Each Tablet #30 1-2 EACH PO Q6H PRN PRN PAIN 


   Prescribed by: SHANNAN WERNER on 17 0248


Phenazopyridine HCl 200 Mg Tablet #10 1 TAB PO TID PRN PRN PAIN 


   Prescribed by: SHANNAN WERNER on 17 0248





Constitutional:   see HPINo chills, No fever


EENTM:   no symptoms reported


Respiratory:   no symptoms reported


Cardiovascular:   no symptoms reported


Gastrointestinal:   see HPI abdominal pain nauseaNo vomiting


Genitourinary:   flank pain hematuria


Pregnant:  No


Musculoskeletal:   back painNo muscle pain


Skin:   no symptoms reported


All Other Systemes Reviewed


Negative Unless Noted:  Yes





Past Medical-Social-Family Hx


Patient Social History


Alcohol Use:  Occasionally Uses


Recreational Drug Use:  No


Smoking Status:  Former Smoker


Type Used:  Cigarettes


Recent Foreign Travel:  No


Contact w/Someone Who Travel:  No


Recent Infectious Disease Expo:  No


Recent Hopitalizations:  No





Immunizations Up To Date


Tetanus Booster (TDap):  Unknown





Seasonal Allergies


Seasonal Allergies:  No





Surgeries


HX Surgeries:  Yes (BACK)


Surgeries:  Gallbladder, Orthopedic





Respiratory


Hx Respiratory Disorders:  No





Cardiovascular


Hx Cardiac Disorders:  No





Neurological


Hx Neurological Disorders:  No





Reproductive System


Pregnant:  No


Hx Reproductive Disorders:  No





Genitourinary


Hx Genitourinary Disorders:  Yes


Genitourinary Disorders:  Kidney Stones





Gastrointestinal


Hx Gastrointestinal Disorders:  No





Musculoskeletal


Hx Musculoskeletal Disorders:  No





Endocrine


Hx Endocrine Disorders:  Yes (obesity)





HEENT


HX ENT Disorders:  No





Cancer


Hx Cancer:  No





Psychosocial


Hx Psychiatric Problems:  No





Integumentary


HX Skin/Integumentary Disorder:  No





Blood Transfusions


Hx Blood Disorders:  No





Reviewed Nursing Assessment


Reviewed/Agree w Nursing PMH:  Yes





Family Medical History


Significant Family History:  COPD, Hypertension





Physical Exam


Vital Signs





 Vital Sign - Last 12Hours








 17





 07:39


 


Temp 96.6


 


Pulse 77


 


Resp 22


 


B/P 140/85


 


Pulse Ox 97


 


O2 Delivery Room Air





Capillary Refill : Less Than 3 Seconds


General Appearance:   WD/WN no apparent distress


Neck:   full range of motion supple


Cardiovascular:   regular rate, rhythm no murmur


Respiratory:   lungs clear normal breath sounds


Gastrointestinal:   soft tenderness (mild left lower quadrant)


Back:   normal inspection no CVA tenderness no vertebral tenderness


Extremities:   non-tender normal inspection


Neurologic/Psychiatric:   alert oriented x 3





Progress/Results/Core Measures


Results/Orders


Lab Results





 Laboratory Tests








Test


  17


05:03 17


07:50 Range/Units


 


 


Alanine Aminotransferase


(ALT/SGPT) 30 


  


  0-55  U/L


 


 


Albumin 3.9   3.2-4.5  G/DL


 


Alkaline Phosphatase 67     U/L


 


Anion Gap 8   5-14  MMOL/L


 


Aspartate Amino Transf


(AST/SGOT) 18 


  


  5-34  U/L


 


 


BUN/Creatinine Ratio 10    


 


Basophils # (Auto)


  0.1 


  


  0.0-0.1


10^3/uL


 


Basophils (%) (Auto) 1   0-10  %


 


Blood Urea Nitrogen 10   7-18  MG/DL


 


Calcium Level 8.8   8.5-10.1  MG/DL


 


Carbon Dioxide Level 24   21-32  MMOL/L


 


Chloride Level 105     MMOL/L


 


Creatinine


  1.00 


  


  0.60-1.30


MG/DL


 


Eosinophils # (Auto)


  0.1 


  


  0.0-0.3


10^3/uL


 


Eosinophils (%) (Auto) 1   0-10  %


 


Estimat Glomerular Filtration


Rate > 60 


  


   


 


 


Glucose Level 112 H    MG/DL


 


Hematocrit 37   35-52  %


 


Hemoglobin 12.5   11.5-16.0  G/DL


 


Lymphocytes # (Auto) 2.2   1.0-4.0  X 10^3


 


Lymphocytes (%) (Auto) 22   12-44  %


 


Mean Corpuscular Hemoglobin 28   25-34  PG


 


Mean Corpuscular Hemoglobin


Concent 34 


  


  32-36  G/DL


 


 


Mean Corpuscular Volume 82   80-99  FL


 


Mean Platelet Volume 9.1   7.4-10.4  FL


 


Monocytes # (Auto) 0.8   0.0-1.0  X 10^3


 


Monocytes (%) (Auto) 8   0-12  %


 


Neutrophils # (Auto) 6.9   1.8-7.8  X 10^3


 


Neutrophils (%) (Auto) 68   42-75  %


 


Platelet Count


  344 


  


  130-400


10^3/uL


 


Potassium Level 3.7   3.6-5.0  MMOL/L


 


Red Blood Count


  4.55 


  


  4.35-5.85


10^6/uL


 


Red Cell Distribution Width 14.5   10.0-14.5  %


 


Sodium Level 137   135-145  MMOL/L


 


Total Bilirubin 0.4   0.1-1.0  MG/DL


 


Total Protein 7.3   6.4-8.2  G/DL


 


White Blood Count


  10.1 


  


  4.3-11.0


10^3/uL


 


Urine Bacteria  MODERATE H  /HPF


 


Urine Bilirubin  NEGATIVE  NEGATIVE  


 


Urine Casts  NONE   /LPF


 


Urine Clarity  CLEAR   


 


Urine Color  YELLOW   


 


Urine Crystals  NONE   /LPF


 


Urine Culture Indicated  YES   


 


Urine Glucose (UA)  NEGATIVE  NEGATIVE  


 


Urine Ketones  NEGATIVE  NEGATIVE  


 


Urine Leukocyte Esterase  1+ H NEGATIVE  


 


Urine Mucus  MODERATE H  /LPF


 


Urine Nitrite  NEGATIVE  NEGATIVE  


 


Urine Protein  1+ H NEGATIVE  


 


Urine RBC  2-5 H  /HPF


 


Urine RBC (Auto)  5+ H NEGATIVE  


 


Urine Specific Gravity  1.020  1.016-1.022  


 


Urine Squamous Epithelial


Cells 


  5-10 


   /HPF


 


 


Urine Urobilinogen  NORMAL  NORMAL  MG/DL


 


Urine WBC  5-10 H  /HPF


 


Urine pH  5  5-9  








My Orders





 Orders-DAVID GRIGGS MD


Cbc With Automated Diff (17 07:50)


Comprehensive Metabolic Panel (17 07:50)


Ua Culture If Indicated (17 07:50)


Ketorolac Injection (Toradol Injection) (17 07:50)


Hydromorphone Injection (Dilaudid Inject (17 07:50)


Saline Lock/Iv-Start (17 07:50)


Ns Iv 1000 Ml (Sodium Chloride 0.9%) (17 07:50)


Abdomen/Kub 1view (17 07:50)


Us Renal Limited [id number] (17 07:50)


Urine Culture (17 07:50)


Us Non Ob Transvaginal [id number] (17 07:50)


Ceftriaxone Injection (Rocephin Injectio (17 10:00)





Medications Given in ED





 Current Medications








 Medications  Dose


 Ordered  Sig/Quoc


 Route  Start Time


 Stop Time Status Last Admin


Dose Admin


 


 Ceftriaxone


 Sodium/Sodium


 Chloride  50 ml @ 


 100 mls/hr  ONCE  ONCE


 IV  17 10:00


 17 10:29 DC 17 09:59


100 MLS/HR


 


 Sodium Chloride  1,000 ml @ 


 0 mls/hr  Q0M ONCE


 IV  17 07:50


 17 07:56 DC 17 08:55


1,000 MLS/HR








Vital Signs/I&O





 Vital Sign - Last 12Hours








 17





 07:39


 


Temp 96.6


 


Pulse 77


 


Resp 22


 


B/P 140/85


 


Pulse Ox 97


 


O2 Delivery Room Air








Blood Pressure Mean:  103


Progress Note :  


Progress Note


Seen and evaluated.  Chart reviewed from previous visit.  IV, labs, UA, KUB and 

ultrasound left kidney and pelvis ordered.  Dilaudid 1 mg IV and Toradol 30 mg 

IV ordered.  Normal saline 1 L bolus.  Monitor patient.  0950: Pain improved.  

Patient able to ambulate without difficulty.  Rocephin 1 g IV for suspected 

urinary tract infection.  I did discuss the case with Dr. Tawil.  He will 

continue with previous plan for procedure notes Tuesday.  I will continue 

outpatient antibiotics.  1019: I did discuss the case with Dr. Soler.  She is 

out of town tomorrow and is unable to follow-up but is requesting obstetrician 

evaluation if possible.  1025: I did discuss the case with Dr. Knox.  He will 

assist in follow-up with the patient.  Patient is to go by his office after 

discharge and they will set up repeat ultrasound tomorrow due to concerns of 

left ovary with potentially a poor flow.  This is discussed with the patient 

who verbalize understanding.  Pain is markedly improved overall and she has no 

significant tenderness on repeat abdominal exam.  Discharged home with return 

precautions.  Patient verbalize understanding instructions and agreement with 

plan.





Diagnostic Imaging





   Diagonstic Imaging:  Ultrasound


   Plain Films/CT/US/NM/MRI:  other (renal)


Comments


 [facility] [place], [place]





NAME:   PHI WAY


MED REC#:   [id number]


ACCOUNT#:   [id number]


PT STATUS:   REG ER


:   1975


PHYSICIAN:   DAVID GRIGGS MD


ADMIT DATE:   17/ER


 ***Draft***


Date of Exam:17





US RENAL LIMITED [id number]














Clinical indication: Patient with left flank pain.





Exam: Limited renal ultrasound with focus on the left kidney.





Comparison: CT scan of the abdomen and pelvis performed with IV


contrast dated 2017.





Findings:


Again seen mild left hydronephrosis. Otherwise left kidney has


normal cortical thickness, echogenicity, size, and shape. The


right kidney measures 13.1 cm in craniocaudal dimension. The


bladder is predominantly decompressed and unable to be


appropriately evaluated. Single image of right kidney shows no


significant abnormality as visualized.





Impression:


1: Again seen mild left hydronephrosis. Patient was noted to


have a stone in the distal left ureter on the comparison CT


scan.





2:  Bladder is decompressed and cannot be appropriately


evaluated on this exam.





This case was discussed with Dr. David Griggs via the


telephone on 2017 at 0930 hrs.





Dictated on workstation # [id number]








Dict:   17 0902


Trans:   17 0944


QUEENIE [id number]





Interpreted by:     CECILIA ESPARZA MD


Electronically signed by:


   Reviewed:  Discussed w/Radiologist








   Diagonstic Imaging:  Xray


   Plain Films/CT/US/NM/MRI:  abdomen


Comments


 [facility]


 [place], [place]





NAME:   PHI WAY


MED REC#:   [id number]


ACCOUNT#:   [id number]


PT STATUS:   REG ER


:   1975


PHYSICIAN:   DAVID GRIGGS MD


ADMIT DATE:   17/ER


 ***Draft***


Date of Exam:17





ABDOMEN/KUB 1VIEW














INDICATION: Left kidney stone. Exam compared with study


2017.





FINDINGS: There are two punctate left pelvic calcifications


projecting along the course of the previously opacified left


ureter. Tiny 2 to 3-mm distal left ureteral stones in the


appropriate scenario could not be excluded. If indicated CT may


provide additional utility. No opaque calculi projecting over


the renal beds. No bowel obstruction. There are clips in the


gallbladder fossa.





IMPRESSION: Faint radiopacities in the left hemipelvis found at


the level of the left ureter distally and may reflect tiny


ureteral stones. Correlate clinically. If indicated CT may


provide additional utility. No other significant finding.





Dictated on workstation # [id number]








Dict:   17 0855


Trans:   17 0909


NITISH [id number]





Interpreted by:     ISABEL CASTRO


Electronically signed by:








   Erik[facility] Imaging:  Ultrasound


   Plain Films/CT/US/NM/MRI:  pelvis


Comments


Date of Exam:17





US NON OB TRANSVAGINAL [id number]














Clinical indication: Patient with left pelvic pain and constant


burning.





Exam: Transvaginal ultrasound pelvis.





Comparison: Pelvic ultrasound dated 9/15/2011. CT scan abdomen


and pelvis performed with IV contrast dated 2017.





FINDINGS:


There is a roughly 1.9 cm x 1.9 cm x 2.0 cm slightly


heterogeneous hypoechoic mass involving the anterior aspect of


the uterine fundus likely representing a fibroid. This was not


seen on comparison pelvic ultrasound. Endometrial stripe


measures 7 mm.





The right ovary is not able to be visualized on this exam. Left


ovary is within normal limits for size measuring 3.4 cm x 4.2 cm


x 3.9 cm. There is no significant cystic changes seen. Doppler


flow is difficult to evaluate within the left ovary region.


Otherwise, the uterus has normal configuration, echogenicity,


size, and shape and measures 8.9 cm x 5.4 cm x 4.7 cm.





There is no significant free fluid in the pelvis. This case was


discussed with ultrasound technologist who spoke with the


performing ultrasound technologist after the exam.





Impression:


1: Unable to obtain Doppler flow within the left ovary, but


ovary is just within normal limits for size. There is no


significant free fluid in the pelvis. This is nonspecific and


early left ovarian torsion cannot be completely excluded.


Clinical correlation for patient's symptoms is suggested.


Short-term repeat pelvic ultrasound in 12-24 hours may help


better evaluate for interval changes.





2: The right ovary was unable to be visualized and cannot be


evaluated.





3: There is interval development of a 2.0 cm uterine fibroid.





Results of this report was discussed with Dr. David Griggs


via the telephone on 2017 at 0930 hrs.





Dictated on workstation # [id number]








Dict:   [id number]


Trans:   17 0944


[facility] [id number]





Interpreted by:     CECILIA ESPARZA MD


Electronically signed by:


   Reviewed:  Discussed w/Radiologist





Departure


Impression


Impression:  


 Primary Impression:  


 Ovarian cyst, left


 Additional Impressions:  


 Left ureteral stone


 Left sided abdominal pain


Disposition:  01 HOME, SELF-CARE


Condition:  Improved





Departure-Patient Inst.


Decision time for Depature:  11:06


Referrals:  


SOFIA KNOX JACQUELINE S DO (PCP/Family)


Primary Care Physician








TAWIL,ELIAS A MD


Patient Instructions:  Acute Abdomen (Belly Pain), Adult (DC), Kidney Stones in 

Adults, Ovarian Cyst (DC), Urinary Tract Infection, Adult (DC)





Add. Discharge Instructions:


All discharge instructions reviewed with patient and/or family. Voiced 

understanding.





Take medications as directed.  Follow-up with Dr. Knox's office today after 

discharge from here for getting set up for ultrasound tomorrow.  Follow-up with 

your doctor next week.  Keep appointment and outpatient requirements for the 

procedure that you're due to get with Dr. Tawil next week.  Return for worse 

pain, fever, vomiting, weakness, breathing problems or other concerns as needed.


Scripts


Cephalexin 500 Mg [id number] Mg PO BID #20 TAB


   Prov:DAVID GRIGGS MD         17





Copy


Copies To 1:   KIRSTIE SOLER DO


Copies To 2:   SOFIA KNOX TIMOTHY D MD 2017 08:03

## 2017-02-09 NOTE — DIAGNOSTIC IMAGING REPORT
INDICATION: Left kidney stone. Exam compared with study

1/28/2017.



FINDINGS: There are two punctate left pelvic calcifications

projecting along the course of the previously opacified left

ureter. Tiny 2 to 3-mm distal left ureteral stones in the

appropriate scenario could not be excluded. If indicated CT may

provide additional utility. No opaque calculi projecting over

the renal beds. No bowel obstruction. There are clips in the

gallbladder fossa.



IMPRESSION: Faint radiopacities in the left hemipelvis found at

the level of the left ureter distally and may reflect tiny

ureteral stones. Correlate clinically. If indicated CT may

provide additional utility. No other significant finding.



Dictated by:



Dictated on workstation # [id number]

## 2017-02-09 NOTE — XMS REPORT
Continuity of Care Document

 Created on: 2017



[facility]

External Reference #: [id number]

: 1975

Sex: Female



Demographics







 Address  [address] 

[place]  [address]

 

 Home Phone  [phone]

 

 Preferred Language  English

 

 Marital Status  Unknown

 

 Jewish Affiliation  Unknown

 

 Race  Unknown

 

 Ethnic Group  Unknown





Author







 Author  [facility]

 

 Organization  [facility]

 

 Address  Unknown

 

 Phone  Unavailable







Support







 Name  Relationship  Address  Phone

 

 SHANNAN DAVID MD  Caregiver  [address]

[place]  [address] [phone]

 

 KIRSTIE CASTANEDA DO  Caregiver  [address]

[place]  [address] [phone]

 

 BILL PARK  Next Of Kin  [address]

[place]  [address] [phone]







Care Team Providers







 Care Team Member Name  Role  Phone

 

 KIRSTIE CASTANEDA DO  PCP  [phone]







Insurance Providers







 Payer Name  Policy Number  Subscriber Name  Relationship

 

 [employer]  [id number]  [facility] Self / Same As Patient







Advance Directives







 Directive  Response  Recorded Date/Time

 

 Advance Directives  No  17 12:29am

 

 Resuscitation Status  Full Code  17 12:29am







Chief Complaint and Reason for Visit







 Chief Complaint  -Female

 

 Reason for Visit  [id number]

Left sided abdominal pain

[id number]







Problems

Active Problems







 Medical Problem  Onset Date  Status

 

 Left sided abdominal pain  Unknown  Acute

 

 Left ureteral stone  Unknown  Acute

 

 Ovarian cyst, left  Unknown  Acute







Medications

Current Home Medications







 Medication  Dose  Units  Route  Directions  Days/Qty  Instructions  Start Date

 

 Ciprofloxacin Hcl 250 Mg  1  Cap  Oral  Twice A Day  17

 

 Methenam/Me Blue/Ba/Salicy/Hyo 1 Each  1  Each  Oral  As Directed        

 

 Oxycodone Hcl/Acetaminophen 1 Each  1-2  Each  Oral  Every 6 Hours as needed 
for Pain  30     17

 

 Phenazopyridine Hcl 200 Mg  1  Tab  Oral  Three Times A Day as needed for Pain
  10     17





Past Home Medications







 Medication  Directions  Ordered  Status

 

 Oxycodone Hcl/Acetaminophen 1 Each Tablet, 1 Each Oral  Every 4HRS  11  
Discontinued

 

 Omeprazole 20 Mg Tablet.dr, 20 Mg Oral  Twice A Day  11  Discontinued

 

 Famotidine (Pepcid) 20 Mg Tablet, 1 Each Oral  Twice A Day  11  
Discontinued







Social History







 Social History Problem  Response  Recorded Date/Time

 

 Alcohol Use  Occasionally Uses  2017 12:29am

 

 Recreational Drug Use  No  2017 12:29am

 

 Recent Foreign Travel  No  2017 12:29am

 

 Recent Infectious Disease Exposure  No  2017 12:29am

 

 Smoking Status  Never a Smoker  2017 12:29am

 

 Recent Hopitalizations  No  2017 12:29am









 Query  Response  Start Date  Stop Date

 

 Smoking Status  Never a Smoker      







Hospital Discharge Instructions

No hospital discharge instructions.



Plan of Care







 Discharge Date  17 3:07am

 

 Disposition  01 HOME, SELF-CARE

 

 Condition at Discharge  Improved

 

 Instructions/Education Provided  Kidney Stones in Adults

 

 Prescriptions  See Medication Section

 

 Referrals  KIRSTIE CASTANEDA DO - Primary Care Physician

 

 Additional Instructions/Education  Complete your antibiotics as prescribed.  
Drink plenty of clear 

liquids.  You may discontinue Hyophen.  Use Percocet and peridium 

as prescribed.  Keep your appointment with Dr. Tawil.  Return to 

the emergency room if symptoms worsen.  Please follow-up with 

your primary care provider or women's health provider regarding 

your ovarian cyst.  It needs to be monitored for stability.  

Strain your urine and present any stones collected at your 

follow-up appointment.  

  

  

  

  

  

  

All discharge instructions reviewed with patient and/or family. 

Voiced understanding.







Functional Status

No functional status results.



Allergies, Adverse Reactions, Alerts

No known allergies.



Immunizations

No immunization records.



Vital Signs

Acute Vital Signs





 Vital  Response  Date/Time

 

 Temperature (Fahrenheit)  98.1 degrees F (97.6 - 99.5)  2017 3:03am

 

 Temperature (Calculated Celsius)  36.89846 degrees C (36.4 - 37.5)  2017 
3:03am

 

 Temperature Source  Temporal  2017 3:03am

 

 Pulse Rate (adult)  120 bpm (60 - 90)  2017 3:03am

 

 Respiratory Rate  16 bpm (12 - 24)  2017 3:03am

 

 O2 Sat by Pulse Oximetry  95 % (88 - 100)  2017 3:03am

 

 Blood Pressure  132/84 mm Hg  2017 3:03am

 

    Blood Pressure Mean  117 mm Hg  2017 12:29am

 

 Pain      

 

    Numeric Pain Scale  2  2017 3:03am

 

 Height (Feet)  5 feet  2017 12:29am

 

 Height (Inches)  5 inches  2017 12:29am

 

 Height (Calculated Centimeters)  165.062747 cm  2017 12:29am

 

 Weight (Pounds)  250 pounds  2017 12:29am

 

 Weight (Calculated Kilograms)  113.370843 kilograms  2017 12:29am

 

 Capillary Refill      

 

    Capillary Refill  Less Than 3 Seconds  2017 12:29am

 

 Height  5 ft 5 in   

 

 Weight  250 lb   

 

 Body Mass Index  41.6 kg/m^2   







Results

Laboratory Results







 Test Name  Result  Units  Flags  Reference  Collection Date/Time  Result Date/
Time  Comments

 

 White Blood Count  15.0  10^3/uL  H  4.3-11.0  2017 1:072017 1:
22am   

 

 Red Blood Count  4.55  10^6/uL     4.35-5.85  2017 1:072017 1:
22am   

 

 Hemoglobin  12.7  G/DL     11.5-16.0  2017 1:072017 1:22am   

 

 Hematocrit  37  %     35-52  2017 1:072017 1:22am   

 

 Mean Corpuscular Volume  82  FL     80-99  2017 1:072017 1:
22am   

 

 Mean Corpuscular Hemoglobin  28  PG     25-34  2017 1:07am  2017 1:
22am   

 

 Mean Corpuscular Hemoglobin Concent  34  G/DL     32-36  2017 1:07 1:22am   

 

 Red Cell Distribution Width  14.4  %     10.0-14.5  2017 1:072017 1:22am   

 

 Platelet Count  381  10^3/uL     130-400  2017 1:072017 1:22am
   

 

 Mean Platelet Volume  9.5  FL     7.4-10.4  2017 1:07am  2017 1:
22am   

 

 Neutrophils (%) (Auto)  76  %  H  42-75  2017 1:072017 1:22am 
  

 

 Lymphocytes (%) (Auto)  16  %     12-44  2017 1:072017 1:22am 
  

 

 Monocytes (%) (Auto)  7  %     0-12  2017 1:072017 1:22am   

 

 Eosinophils (%) (Auto)  1  %     0-10  2017 1:072017 1:22am   

 

 Basophils (%) (Auto)  0  %     0-10  2017 1:072017 1:22am   

 

 Neutrophils # (Auto)  11.3  X 10^3  H  1.8-7.8  2017 1:072017 1
:22am   

 

 Lymphocytes # (Auto)  2.5  X 10^3     1.0-4.0  2017 1:072017 1:
22am   

 

 Monocytes # (Auto)  1.0  X 10^3     0.0-1.0  2017 1:072017 1:
22am   

 

 Eosinophils # (Auto)  0.2  10^3/uL     0.0-0.3  2017 1:07am  2017 1
:22am   

 

 Basophils # (Auto)  0.1  10^3/uL     0.0-0.1  2017 1:072017 1:
22am   

 

 Neutrophils % (Manual)  70  %        2017 1:072017 1:36am   

 

 Band Neutrophils  0  %        2017 1:072017 1:36am   

 

 Lymphocytes % (Manual)  25  %        2017 1:07am  2017 1:36am   

 

 Monocytes % (Manual)  4  %        2017 1:07am  2017 1:36am   

 

 Eosinophils % (Manual)  1  %        2017 1:07am  2017 1:36am   

 

 Basophils % (Manual)  0  %        2017 1:07am  2017 1:36am   

 

 Blood Morphology Comment  NORMAL           2017 1:072017 1:
36am   

 

 Urine Color  OTHER     *     2017 12:25am  2017 12:50am  SPECIMEN 
IS BLUE/TEAL IN COLOR WHICH MAY CAUSE COLOR 

INTERFERENCE WITH DIPSTICK ANALYSIS.

 

 Urine Clarity  CLEAR           2017 12:25am  2017 12:50am   

 

 Urine pH  6.5        5-9  2017 12:25am  2017 12:50am   

 

 Urine Specific Gravity  1.025     *  1.016-1.022  2017 12:25am  2017 12:50am   

 

 Urine Protein  2+     *  NEGATIVE  2017 12:25am  2017 12:50am   

 

 Urine Glucose (UA)  NEGATIVE        NEGATIVE  2017 12:25am  2017 12
:50am   

 

 Urine RBC (Auto)  2+     *  NEGATIVE  2017 12:25am  2017 12:50am   

 

 Urine Ketones  1+     *  NEGATIVE  2017 12:25am  2017 12:50am   

 

 Urine Nitrite  NEGATIVE        NEGATIVE  2017 12:25am  2017 12:
50am   

 

 Urine Bilirubin  1+     *  NEGATIVE  2017 12:25am  2017 12:50am  
CONFIRMATORY ICTOTEST IS NEGATIVE.

 

 Urine Urobilinogen  NORMAL  MG/DL     NORMAL  2017 12:25am  2017 12
:50am   

 

 Urine Leukocyte Esterase  1+     *  NEGATIVE  2017 12:25am  2017 12
:50am   

 

 Urine RBC  NONE  /HPF        2017 12:25am  2017 12:50am  
MICROSCOPIC ANALYSIS PERFORMED ON 1 ML OF UNSPUN SPECIMEN 

DUE TO LOW VOLUME.

 

 Urine WBC  NONE  /HPF        2017 12:25am  2017 12:50am   

 

 Urine Bacteria  TRACE  /HPF        2017 12:25am  2017 12:50am   

 

 Urine Squamous Epithelial Cells  5-10  /HPF        2017 12:25am  2017 12:50am   

 

 Urine Crystals  PRESENT  /LPF  *     2017 12:25am  2017 12:50am   

 

 Urine Calcium Oxalate Crystals  MODERATE  /LPF  *     2017 12:25am   12:50am   

 

 Urine Casts  NONE  /LPF        2017 12:25am  2017 12:50am   

 

 Urine Mucus  NEGATIVE  /LPF        2017 12:2017 12:50am   

 

 Urine Culture Indicated  YES           2017 12:252017 12:50am 
  

 

 Sodium Level  138  MMOL/L     135-145  2017 1:2017 1:36am   

 

 Potassium Level  3.8  MMOL/L     3.6-5.0  2017 1:2017 1:36am
   

 

 Chloride Level  104  MMOL/L       2017 1:2017 1:36am   

 

 Carbon Dioxide Level  21  MMOL/L     21-32  2017 1:2017 1:
36am   

 

 Anion Gap  13  MMOL/L     5-14  2017 1:2017 1:36am   

 

 Blood Urea Nitrogen  12  MG/DL     7-18  2017 1:072017 1:36am 
  

 

 Creatinine  1.12  MG/DL     0.60-1.30  2017 1:2017 1:36am   

 

 BUN/Creatinine Ratio  11           2017 1:2017 1:36am   

 

 Estimat Glomerular Filtration Rate  53           2017 1:2017 
1:36am                    GFR INTERPRETIVE DATA  

  

         UNITS FOR ESTIMATED GFR (eGFR): mL/min/1.73 M2  

         REFERENCE RANGE FOR ESTIMATED GFR (eGFR)  

                                          eGFR  

         NORMAL eGFR                       >60  

         MODERATELY DECREASED eGFR          30-59  

         SEVERLY DECREASED eGFR             15-29  

         KIDNEY FAILURE                    <15 (OR DIALYSIS)  

 

 Glucose Level  109  MG/DL  H    2017 1:2017 1:36am   

 

 Calcium Level  9.1  MG/DL     8.5-10.1  2017 1:2017 1:36am   

 

 Total Bilirubin  0.3  MG/DL     0.1-1.0  2017 1:2017 1:36am 
  

 

 Alkaline Phosphatase  76  U/L       2017 1:2017 1:36am
   

 

 Aspartate Amino Transf (AST/SGOT)  17  U/L     5-34  2017 1:2017 1:36am   

 

 Alanine Aminotransferase (ALT/SGPT)  22  U/L     0-55  2017 1:07am   1:36am   

 

 Total Protein  7.3  G/DL     6.4-8.2  2017 1:07am  2017 1:36am   

 

 Albumin  4.0  G/DL     3.2-4.5  2017 1:07am  2017 1:36am   

 

 Lipase  14  U/L     8-78  2017 1:07am  2017 1:36am   

 

 C-Reactive Protein High Sensitivity  4.26  MG/DL  H  0.00-0.50  2017 1:
07am  2017 1:36am   







Procedures

No known history of procedures.



Encounters







 Encounter  Location  Arrival/Admit Date  Discharge/Depart Date  Attending 
Provider

 

 Departed Emergency Room  [facility]  17 12:17am   3:07am  SHANNAN DAVID MD











 Recent Diagnosis

## 2017-02-10 ENCOUNTER — HOSPITAL ENCOUNTER (OUTPATIENT)
Dept: HOSPITAL 75 - RAD | Age: 42
End: 2017-02-10
Attending: OBSTETRICS & GYNECOLOGY
Payer: COMMERCIAL

## 2017-02-10 DIAGNOSIS — D25.9: ICD-10-CM

## 2017-02-10 DIAGNOSIS — N83.53: Primary | ICD-10-CM

## 2017-02-10 LAB
CALCIUM PARA THYROID HORMONE: 8.9 MG/DL (ref 8.5–10.5)
PTH INTACT IRMA: 114 PG/ML (ref 10–65)

## 2017-02-10 PROCEDURE — 76830 TRANSVAGINAL US NON-OB: CPT

## 2017-02-10 PROCEDURE — 76856 US EXAM PELVIC COMPLETE: CPT

## 2017-02-10 NOTE — XMS REPORT
Continuity of Care Document

 Created on: 2017



[facility]I [facility]

External Reference #: [id number]

: 1975

Sex: Female



Demographics







 Address  [address]

[place]  [address]

 

 Home Phone  [phone]

 

 Preferred Language  English

 

 Marital Status  Unknown

 

 Zoroastrian Affiliation  Unknown

 

 Race  Unknown

 

 Ethnic Group  Unknown





Author







 Author  [facility]

 

 Organization  [facility]

 

 Address  Unknown

 

 Phone  Unavailable







Support







 Name  Relationship  Address  Phone

 

 SANTOSDYANA FOURNIERKIRSTIEAUGIE PINEDO DO  Caregiver  [address]

[place]  [address] [phone]

 

 BRYON GRIGGS MD  Caregiver  [address]

[place]  [address] [phone]

 

 BILL PARK  Next Of Kin  [address]

[place]  [address] [phone]







Care Team Providers







 Care Team Member Name  Role  Phone

 

 SANTOSIRLANDAKIRSTIE DO  PCP  [phone]







Insurance Providers







 Payer Name  Policy Number  Subscriber Name  Relationship

 

 [employer]  [id number]  [facility] Self / Same As Patient







Advance Directives







 Directive  Response  Recorded Date/Time

 

 Advance Directives  No  17 7:43am

 

 Resuscitation Status  Full Code  17 7:43am







Chief Complaint and Reason for Visit







 Chief Complaint  Abdominal/GI Problems

 

 Reason for Visit  [id number]

Left sided abdominal pain

[id number]







Problems

Active Problems







 Medical Problem  Onset Date  Status

 

 Left sided abdominal pain  Unknown  Acute

 

 Left ureteral stone  Unknown  Acute

 

 Ovarian cyst, left  Unknown  Acute







Medications

Current Home Medications







 Medication  Dose  Units  Route  Directions  Days/Qty  Instructions  Start Date

 

 Ciprofloxacin Hcl 250 Mg  1  Cap  Oral  Twice A Day  17

 

 Methenam/Me Blue/Ba/Salicy/Hyo 1 Each  1  Each  Oral  As Directed        

 

 Oxycodone Hcl/Acetaminophen 1 Each  1-2  Each  Oral  Every 6 Hours as needed 
for Pain  30     17

 

 Phenazopyridine Hcl 200 Mg  1  Tab  Oral  Three Times A Day as needed for Pain
  10     17

 

 Cephalexin 500 Mg  500  Mg  Oral  Twice A Day  20     17





Past Home Medications







 Medication  Directions  Ordered  Status

 

 Oxycodone Hcl/Acetaminophen 1 Each Tablet, 1 Each Oral  Every 4HRS  11  
Discontinued

 

 Omeprazole 20 Mg Tablet.dr, 20 Mg Oral  Twice A Day  11  Discontinued

 

 Famotidine (Pepcid) 20 Mg Tablet, 1 Each Oral  Twice A Day  11  
Discontinued







Social History







 Social History Problem  Response  Recorded Date/Time

 

 Alcohol Use  Occasionally Uses  2017 7:43am

 

 Recreational Drug Use  No  2017 7:43am

 

 Recent Foreign Travel  No  2017 7:39am

 

 Recent Infectious Disease Exposure  No  2017 7:39am

 

 Smoking Status  Former Smoker  2017 7:43am

 

 Type Used  Cigarettes  2017 7:43am

 

 Recent Hopitalizations  No  2017 7:43am









 Query  Response  Start Date  Stop Date

 

 Smoking Status  Former Smoker      







Hospital Discharge Instructions

No hospital discharge instructions.



Plan of Care







 Discharge Date  17 11:21am

 

 Disposition  01 HOME, SELF-CARE

 

 Condition at Discharge  Improved

 

 Instructions/Education Provided  Kidney Stones in Adults

Urinary Tract Infection, Adult (DC)

Acute Abdomen (Belly Pain), Adult (DC)

Ovarian Cyst (DC)

 

 Prescriptions  See Medication Section

 

 Referrals  SOFIA KNOX JACQUELINE S DO - Primary Care Physician





KIRSTIE CASTANEDA DO - Primary Care Physician





TAWIL,ELIAS A MD - 

 

 Additional Instructions/Education  All discharge instructions reviewed with 
patient and/or family. Voiced 

understanding.  

  

Take medications as directed.  Follow-up with Dr. Knox's office today after 

discharge from here for getting set up for ultrasound tomorrow.  Follow-up with 

your doctor next week.  Keep appointment and outpatient requirements for the 

procedure that you're due to get with Dr. Tawil next week.  Return for worse 

pain, fever, vomiting, weakness, breathing problems or other concerns as needed.







Functional Status

No functional status results.



Allergies, Adverse Reactions, Alerts

No known allergies.



Immunizations

No immunization records.



Vital Signs

Acute Vital Signs





 Vital  Response  Date/Time

 

 Temperature (Fahrenheit)  96.6 degrees F (97.6 - 99.5)  2017 7:39am

 

 Temperature (Calculated Celsius)  35.27012 degrees C (36.4 - 37.5)  2017 
7:39am

 

 Temperature Source  Temporal  2017 7:39am

 

 Pulse Rate (adult)  77 bpm (60 - 90)  2017 7:39am

 

 Respiratory Rate  22 bpm (12 - 24)  2017 7:39am

 

 O2 Sat by Pulse Oximetry  97 % (88 - 100)  2017 7:39am

 

 Blood Pressure  140/85 mm Hg  2017 7:39am

 

    Blood Pressure Mean  103 mm Hg  2017 7:39am

 

 Pain      

 

    Numeric Pain Scale  5-Moderate Pain  2017 8:55am

 

 Pain      

 

    Numeric Pain Scale  5-Moderate Pain  2017 8:55am

 

 Height (Feet)  5 feet  2017 7:39am

 

 Height (Inches)  5 inches  2017 7:39am

 

 Height (Calculated Centimeters)  165.255639 cm  2017 7:39am

 

 Weight (Pounds)  250 pounds  2017 7:39am

 

 Weight (Calculated Grams)  479675.094 gm  2017 7:39am

 

 Weight (Calculated Kilograms)  113.057684 kilograms  2017 7:39am

 

 Capillary Refill      

 

    Capillary Refill  Less Than 3 Seconds  2017 7:39am

 

 Height  5 ft 5 in   

 

 Weight  250 lb   

 

 Body Mass Index  41.6 kg/m^2   







Results

Laboratory Results







 Test Name  Result  Units  Flags  Reference  Collection Date/Time  Result Date/
Time  Comments

 

 White Blood Count  15.0  10^3/uL  H  4.3-11.0  2017 1:07am  2017 1:
22am   

 

 Red Blood Count  4.55  10^6/uL     4.35-5.85  2017 1:07am  2017 1:
22am   

 

 Hemoglobin  12.7  G/DL     11.5-16.0  2017 1:072017 1:22am   

 

 Hematocrit  37  %     35-52  2017 1:07am  2017 1:22am   

 

 Mean Corpuscular Volume  82  FL     80-99  2017 1:07am  2017 1:
22am   

 

 Mean Corpuscular Hemoglobin  28  PG     25-34  2017 1:07am  2017 1:
22am   

 

 Mean Corpuscular Hemoglobin Concent  34  G/DL     32-36  2017 1:07am   1:22am   

 

 Red Cell Distribution Width  14.4  %     10.0-14.5  2017 1:2017 1:22am   

 

 Platelet Count  381  10^3/uL     130-400  2017 1:2017 1:22am
   

 

 Mean Platelet Volume  9.5  FL     7.4-10.4  2017 1:072017 1:
22am   

 

 Neutrophils (%) (Auto)  76  %  H  42-75  2017 1:2017 1:22am 
  

 

 Lymphocytes (%) (Auto)  16  %     12-44  2017 1:2017 1:22am 
  

 

 Monocytes (%) (Auto)  7  %     0-12  2017 1:2017 1:22am   

 

 Eosinophils (%) (Auto)  1  %     0-10  2017 1:072017 1:22am   

 

 Basophils (%) (Auto)  0  %     0-10  2017 1:2017 1:22am   

 

 Neutrophils # (Auto)  11.3  X 10^3  H  1.8-7.8  2017 1:072017 1
:22am   

 

 Lymphocytes # (Auto)  2.5  X 10^3     1.0-4.0  2017 1:2017 1:
22am   

 

 Monocytes # (Auto)  1.0  X 10^3     0.0-1.0  2017 1:2017 1:
22am   

 

 Eosinophils # (Auto)  0.2  10^3/uL     0.0-0.3  2017 1:07am  2017 1
:22am   

 

 Basophils # (Auto)  0.1  10^3/uL     0.0-0.1  2017 1:2017 1:
22am   

 

 Neutrophils % (Manual)  70  %        2017 1:2017 1:36am   

 

 Band Neutrophils  0  %        2017 1:2017 1:36am   

 

 Lymphocytes % (Manual)  25  %        2017 1:2017 1:36am   

 

 Monocytes % (Manual)  4  %        2017 1:072017 1:36am   

 

 Eosinophils % (Manual)  1  %        2017 1:07am  2017 1:36am   

 

 Basophils % (Manual)  0  %        2017 1:07am  2017 1:36am   

 

 Blood Morphology Comment  NORMAL           2017 1:07am  2017 1:
36am   

 

 Urine Color  OTHER     *     2017 12:2017 12:50am  SPECIMEN 
IS BLUE/TEAL IN COLOR WHICH MAY CAUSE COLOR 

INTERFERENCE WITH DIPSTICK ANALYSIS.

 

 Urine Clarity  CLEAR           2017 12:25am  2017 12:50am   

 

 Urine pH  6.5        5-9  2017 12:am  2017 12:50am   

 

 Urine Specific Gravity  1.025     *  1.016-1.022  2017 12:25am  2017 12:50am   

 

 Urine Protein  2+     *  NEGATIVE  2017 12:25am  2017 12:50am   

 

 Urine Glucose (UA)  NEGATIVE        NEGATIVE  2017 12:25am  2017 12
:50am   

 

 Urine RBC (Auto)  2+     *  NEGATIVE  2017 12:25am  2017 12:50am   

 

 Urine Ketones  1+     *  NEGATIVE  2017 12:25am  2017 12:50am   

 

 Urine Nitrite  NEGATIVE        NEGATIVE  2017 12:25am  2017 12:
50am   

 

 Urine Bilirubin  1+     *  NEGATIVE  2017 12:25am  2017 12:50am  
CONFIRMATORY ICTOTEST IS NEGATIVE.

 

 Urine Urobilinogen  NORMAL  MG/DL     NORMAL  2017 12:25am  2017 12
:50am   

 

 Urine Leukocyte Esterase  1+     *  NEGATIVE  2017 12:25am  2017 12
:50am   

 

 Urine RBC  NONE  /HPF        2017 12:25am  2017 12:50am  
MICROSCOPIC ANALYSIS PERFORMED ON 1 ML OF UNSPUN SPECIMEN 

DUE TO LOW VOLUME.

 

 Urine WBC  NONE  /HPF        2017 12:25am  2017 12:50am   

 

 Urine Bacteria  TRACE  /HPF        2017 12:25am  2017 12:50am   

 

 Urine Squamous Epithelial Cells  5-10  /HPF        2017 12:25am  2017 12:50am   

 

 Urine Crystals  PRESENT  /LPF  *     2017 12:252017 12:50am   

 

 Urine Calcium Oxalate Crystals  MODERATE  /LPF  *     2017 12:25am   12:50am   

 

 Urine Casts  NONE  /LPF        2017 12:25am  2017 12:50am   

 

 Urine Mucus  NEGATIVE  /LPF        2017 12:252017 12:50am   

 

 Urine Culture Indicated  YES           2017 12:2017 12:50am 
  

 

 Sodium Level  138  MMOL/L     135-145  2017 1:2017 1:36am   

 

 Potassium Level  3.8  MMOL/L     3.6-5.0  2017 1:2017 1:36am
   

 

 Chloride Level  104  MMOL/L       2017 1:2017 1:36am   

 

 Carbon Dioxide Level  21  MMOL/L     21-32  2017 1:2017 1:
36am   

 

 Anion Gap  13  MMOL/L     5-14  2017 1:2017 1:36am   

 

 Blood Urea Nitrogen  12  MG/DL     7-18  2017 1:2017 1:36am 
  

 

 Creatinine  1.12  MG/DL     0.60-1.30  2017 1:2017 1:36am   

 

 BUN/Creatinine Ratio  11           2017 1:2017 1:36am   

 

 Estimat Glomerular Filtration Rate  53           2017 1:2017 
1:36am                    GFR INTERPRETIVE DATA  

  

         UNITS FOR ESTIMATED GFR (eGFR): mL/min/1.73 M2  

         REFERENCE RANGE FOR ESTIMATED GFR (eGFR)  

                                          eGFR  

         NORMAL eGFR                       >60  

         MODERATELY DECREASED eGFR          30-59  

         SEVERLY DECREASED eGFR             15-29  

         KIDNEY FAILURE                    <15 (OR DIALYSIS)  

 

 Glucose Level  109  MG/DL  H    2017 1:072017 1:36am   

 

 Calcium Level  9.1  MG/DL     8.5-10.1  2017 1:072017 1:36am   

 

 Total Bilirubin  0.3  MG/DL     0.1-1.0  2017 1:072017 1:36am 
  

 

 Alkaline Phosphatase  76  U/L       2017 1:07am  2017 1:36am
   

 

 Aspartate Amino Transf (AST/SGOT)  17  U/L     5-34  2017 1:072017 1:36am   

 

 Alanine Aminotransferase (ALT/SGPT)  22  U/L     0-55  2017 1:07 1:36am   

 

 Total Protein  7.3  G/DL     6.4-8.2  2017 1:072017 1:36am   

 

 Albumin  4.0  G/DL     3.2-4.5  2017 1:07am  2017 1:36am   

 

 Lipase  14  U/L     8-78  2017 1:072017 1:36am   

 

 C-Reactive Protein High Sensitivity  4.26  MG/DL  H  0.00-0.50  2017 1:
07am  2017 1:36am   





Pending Laboratory Results







 Test Name  Collection Date/Time

 

     

 

     

 

     

 

     

 

     

 

     

 

     

 

     

 

     

 

     

 

     

 

     

 

     

 

     

 

     

 

     

 

     

 

     

 

     

 

     

 

     

 

     

 

     

 

     

 

     

 

     

 

     

 

     

 

     

 

     

 

     

 

     

 

     

 

     

 

     

 

     

 

     

 

     

 

     

 

     

 

     

 

     

 

     

 

     

 

     

 

     

 

     

 

     

 

     

 

     

 

     

 

     

 

     

 

     

 

     

 

     

 

     







Procedures

No known history of procedures.



Encounters







 Encounter  Location  Arrival/Admit Date  Discharge/Depart Date  Attending 
Provider

 

 Departed Emergency Room  [facility]  17 7:34am   11:21am  BRYON GRIGGS MD

 

 Registered Clinic  [facility]  17 5:37am     TAWIL, ELIAS A MD

 

 Departed Emergency Room  [facility]  17 12:17am   3:07am  SHANNAN DAVID MD











 Recent Diagnosis

## 2017-02-10 NOTE — DIAGNOSTIC IMAGING REPORT
INDICATION: Severe pelvic pain, evaluate for ovarian torsion.



COMPARISON: 02/09/2017.



DISCUSSION: Transabdominal and transvaginal sonographic

evaluation of the pelvis was performed. The uterus is normal in

echotexture and size measuring 8.5 x 4.7 x 4.6 cm. 1.7 cm

uterine fibroid appear stable. Normal endometrial thickness

measuring 0.9 cm. The left ovary is enlarged measuring 3.9 x 4.3

x 3.7 cm. Left ovary contains a solid-appearing homogeneous

mass, which measures 3.6 x 3.0 x 3.9 cm. Overall appearance of

the left ovary is stable from yesterday's exam. There is color

Doppler and spectral blood flow within the surrounding ovarian

parenchyma within the left ovary. Underlying etiology of the

mass is indeterminate though diagnostic considerations would

include an endometrioma, hemorrhagic follicle, or solid ovarian

mass, not excluding malignancy. Recommend clinical correlation

and either minimum six-week sonographic followup. Otherwise for

more definitive evaluation, recommend pelvic MRI and/or surgical

consultation. The right ovary measures 2.8 x 3.4 x 1.9 cm. The

right ovary shows normal color and spectral Doppler blood flow.



IMPRESSION:

1. There is no evidence of ovarian torsion on today's exam.

2. 3.9 cm left ovarian mass is indeterminate. See above

discussion and recommendations.

3. Small uterine fibroid, stable.



Dictated by:



Dictated on workstation # [id number]

## 2017-02-14 ENCOUNTER — HOSPITAL ENCOUNTER (OUTPATIENT)
Dept: HOSPITAL 75 - SDC | Age: 42
Discharge: HOME | End: 2017-02-14
Attending: UROLOGY
Payer: COMMERCIAL

## 2017-02-14 VITALS — WEIGHT: 291 LBS | BODY MASS INDEX: 48.48 KG/M2 | HEIGHT: 65 IN

## 2017-02-14 VITALS — SYSTOLIC BLOOD PRESSURE: 122 MMHG | DIASTOLIC BLOOD PRESSURE: 64 MMHG

## 2017-02-14 VITALS — SYSTOLIC BLOOD PRESSURE: 111 MMHG | DIASTOLIC BLOOD PRESSURE: 60 MMHG

## 2017-02-14 VITALS — DIASTOLIC BLOOD PRESSURE: 64 MMHG | SYSTOLIC BLOOD PRESSURE: 121 MMHG

## 2017-02-14 VITALS — SYSTOLIC BLOOD PRESSURE: 121 MMHG | DIASTOLIC BLOOD PRESSURE: 64 MMHG

## 2017-02-14 VITALS — DIASTOLIC BLOOD PRESSURE: 75 MMHG | SYSTOLIC BLOOD PRESSURE: 122 MMHG

## 2017-02-14 DIAGNOSIS — N20.1: Primary | ICD-10-CM

## 2017-02-14 PROCEDURE — 74000: CPT

## 2017-02-14 PROCEDURE — 84703 CHORIONIC GONADOTROPIN ASSAY: CPT

## 2017-02-14 PROCEDURE — 88300 SURGICAL PATH GROSS: CPT

## 2017-02-14 RX ADMIN — MEPERIDINE HYDROCHLORIDE PRN MG: 50 INJECTION INTRAMUSCULAR; INTRAVENOUS; SUBCUTANEOUS at 09:41

## 2017-02-14 RX ADMIN — MORPHINE SULFATE PRN MG: 10 INJECTION, SOLUTION INTRAMUSCULAR; INTRAVENOUS at 09:28

## 2017-02-14 RX ADMIN — MORPHINE SULFATE PRN MG: 10 INJECTION, SOLUTION INTRAMUSCULAR; INTRAVENOUS at 09:32

## 2017-02-14 RX ADMIN — MEPERIDINE HYDROCHLORIDE PRN MG: 50 INJECTION INTRAMUSCULAR; INTRAVENOUS; SUBCUTANEOUS at 09:39

## 2017-02-14 NOTE — PROGRESS NOTE-PRE OPERATIVE
Pre-Operative Progress Note


H&P Reviewed


The H&P was reviewed, patient examined and no changes noted.


Date H&P Reviewed:  Feb 14, 2017


Time H&P Reviewed:  07:18


Pre-Operative Diagnosis:  LT DISTAL URETERAL STONE








TAWIL,ELIAS A MD Feb 14, 2017 7:18 am

## 2017-02-14 NOTE — DIAGNOSTIC IMAGING REPORT
INDICATION: Urinary tract calculi



Single view of the abdomen is obtained with comparison made to

study of 2/9/2017.



Similar to the previous study, there are two calcifications

projected over the expected location of the distal left ureter

without change in position compared to previous exam. There are

two additional tiny calcifications seen to the right of midline

which are indeterminate between ureteric calculi or phleboliths.

Overall, there has been no significant change when compared to

the previous exam.



IMPRESSION: Minimal calcifications in the pelvis bilaterally are

indeterminate between ureteric calculi or phleboliths but have

not significantly changed compared to previous exam.



Dictated by:



Dictated on workstation # [id number]

## 2017-02-14 NOTE — XMS REPORT
Continuity of Care Document

 Created on: 2017



[facility]

External Reference #: [id number]

: 1975

Sex: Female



Demographics







 Address  [address]

[place]  [address]

 

 Home Phone  [phone]

 

 Preferred Language  English

 

 Marital Status  Unknown

 

 Religion Affiliation  Unknown

 

 Race  Unknown

 

 Ethnic Group  Unknown





Author







 Author  [facility]

 

 Organization  [facility]

 

 Address  Unknown

 

 Phone  Unavailable







Support







 Name  Relationship  Address  Phone

 

 SANTOSDYANA FOURNIERKIRSTIEAUGIE PINEDO DO  Caregiver  [address]

[place]  [address] [phone]

 

 BRYON GRIGGS MD  Caregiver  [address]

[place]  [address] [phone]

 

 [place]  Next Of Kin  [address]

[place]  [address] [phone]







Care Team Providers







 Care Team Member Name  Role  Phone

 

 SANTOSIRLANDAKIRSTIE DO  PCP  [phone]







Insurance Providers







 Payer Name  Policy Number  Subscriber Name  Relationship

 

 [employer]  [id number]  [place] Self / Same As Patient







Advance Directives







 Directive  Response  Recorded Date/Time

 

 Advance Directives  No  17 7:43am

 

 Resuscitation Status  Full Code  17 7:43am







Chief Complaint and Reason for Visit







 Chief Complaint  Abdominal/GI Problems

 

 Reason for Visit  [id number]

Left sided abdominal pain

[id number]







Problems

Active Problems







 Medical Problem  Onset Date  Status

 

 Left sided abdominal pain  Unknown  Acute

 

 Left ureteral stone  Unknown  Acute

 

 Ovarian cyst, left  Unknown  Acute







Medications

Current Home Medications







 Medication  Dose  Units  Route  Directions  Days/Qty  Instructions  Start Date

 

 Ciprofloxacin Hcl 250 Mg  1  Cap  Oral  Twice A Day  17

 

 Methenam/Me Blue/Ba/Salicy/Hyo 1 Each  1  Each  Oral  As Directed        

 

 Oxycodone Hcl/Acetaminophen 1 Each  1-2  Each  Oral  Every 6 Hours as needed 
for Pain  30     17

 

 Phenazopyridine Hcl 200 Mg  1  Tab  Oral  Three Times A Day as needed for Pain
  10     17

 

 Cephalexin 500 Mg  500  Mg  Oral  Twice A Day  20     17





Past Home Medications







 Medication  Directions  Ordered  Status

 

 Oxycodone Hcl/Acetaminophen 1 Each Tablet, 1 Each Oral  Every 4HRS  11  
Discontinued

 

 Omeprazole 20 Mg Tablet.dr, 20 Mg Oral  Twice A Day  11  Discontinued

 

 Famotidine (Pepcid) 20 Mg Tablet, 1 Each Oral  Twice A Day  11  
Discontinued







Social History







 Social History Problem  Response  Recorded Date/Time

 

 Alcohol Use  Occasionally Uses  2017 7:43am

 

 Recreational Drug Use  No  2017 7:43am

 

 Recent Foreign Travel  No  2017 7:39am

 

 Recent Infectious Disease Exposure  No  2017 7:39am

 

 Smoking Status  Former Smoker  2017 7:43am

 

 Type Used  Cigarettes  2017 7:43am

 

 Recent Hopitalizations  No  2017 7:43am









 Query  Response  Start Date  Stop Date

 

 Smoking Status  Former Smoker      







Hospital Discharge Instructions

No hospital discharge instructions.



Plan of Care







 Discharge Date  17 11:21am

 

 Disposition  01 HOME, SELF-CARE

 

 Condition at Discharge  Improved

 

 Instructions/Education Provided  Kidney Stones in Adults

Urinary Tract Infection, Adult (DC)

Acute Abdomen (Belly Pain), Adult (DC)

Ovarian Cyst (DC)

 

 Prescriptions  See Medication Section

 

 Referrals  SOFIA KNOX JACQUELINE S DO - Primary Care Physician





KIRSTIE CASTANEDA DO - Primary Care Physician





TAWIL,ELIAS A MD - 

 

 Additional Instructions/Education  All discharge instructions reviewed with 
patient and/or family. Voiced 

understanding.  

  

Take medications as directed.  Follow-up with Dr. Knox's office today after 

discharge from here for getting set up for ultrasound tomorrow.  Follow-up with 

your doctor next week.  Keep appointment and outpatient requirements for the 

procedure that you're due to get with Dr. Tawil next week.  Return for worse 

pain, fever, vomiting, weakness, breathing problems or other concerns as needed.







Functional Status

No functional status results.



Allergies, Adverse Reactions, Alerts

No known allergies.



Immunizations

No immunization records.



Vital Signs

Acute Vital Signs





 Vital  Response  Date/Time

 

 Temperature (Fahrenheit)  96.6 degrees F (97.6 - 99.5)  2017 7:39am

 

 Temperature (Calculated Celsius)  35.09631 degrees C (36.4 - 37.5)  2017 
7:39am

 

 Temperature Source  Temporal  2017 7:39am

 

 Pulse Rate (adult)  77 bpm (60 - 90)  2017 7:39am

 

 Respiratory Rate  22 bpm (12 - 24)  2017 7:39am

 

 O2 Sat by Pulse Oximetry  97 % (88 - 100)  2017 7:39am

 

 Blood Pressure  140/85 mm Hg  2017 7:39am

 

    Blood Pressure Mean  103 mm Hg  2017 7:39am

 

 Pain      

 

    Numeric Pain Scale  5-Moderate Pain  2017 8:55am

 

 Pain      

 

    Numeric Pain Scale  5-Moderate Pain  2017 8:55am

 

 Height (Feet)  5 feet  2017 7:39am

 

 Height (Inches)  5 inches  2017 7:39am

 

 Height (Calculated Centimeters)  165.948017 cm  2017 7:39am

 

 Weight (Pounds)  250 pounds  2017 7:39am

 

 Weight (Calculated Grams)  673667.094 gm  2017 7:39am

 

 Weight (Calculated Kilograms)  113.059288 kilograms  2017 7:39am

 

 Capillary Refill      

 

    Capillary Refill  Less Than 3 Seconds  2017 7:39am

 

 Height  5 ft 5 in   

 

 Weight  250 lb   

 

 Body Mass Index  41.6 kg/m^2   







Results

Laboratory Results







 Test Name  Result  Units  Flags  Reference  Collection Date/Time  Result Date/
Time  Comments

 

 White Blood Count  15.0  10^3/uL  H  4.3-11.0  2017 1:07am  2017 1:
22am   

 

 Red Blood Count  4.55  10^6/uL     4.35-5.85  2017 1:07am  2017 1:
22am   

 

 Hemoglobin  12.7  G/DL     11.5-16.0  2017 1:072017 1:22am   

 

 Hematocrit  37  %     35-52  2017 1:07am  2017 1:22am   

 

 Mean Corpuscular Volume  82  FL     80-99  2017 1:07am  2017 1:
22am   

 

 Mean Corpuscular Hemoglobin  28  PG     25-34  2017 1:07am  2017 1:
22am   

 

 Mean Corpuscular Hemoglobin Concent  34  G/DL     32-36  2017 1:07am   1:22am   

 

 Red Cell Distribution Width  14.4  %     10.0-14.5  2017 1:2017 1:22am   

 

 Platelet Count  381  10^3/uL     130-400  2017 1:2017 1:22am
   

 

 Mean Platelet Volume  9.5  FL     7.4-10.4  2017 1:072017 1:
22am   

 

 Neutrophils (%) (Auto)  76  %  H  42-75  2017 1:2017 1:22am 
  

 

 Lymphocytes (%) (Auto)  16  %     12-44  2017 1:2017 1:22am 
  

 

 Monocytes (%) (Auto)  7  %     0-12  2017 1:2017 1:22am   

 

 Eosinophils (%) (Auto)  1  %     0-10  2017 1:072017 1:22am   

 

 Basophils (%) (Auto)  0  %     0-10  2017 1:2017 1:22am   

 

 Neutrophils # (Auto)  11.3  X 10^3  H  1.8-7.8  2017 1:072017 1
:22am   

 

 Lymphocytes # (Auto)  2.5  X 10^3     1.0-4.0  2017 1:2017 1:
22am   

 

 Monocytes # (Auto)  1.0  X 10^3     0.0-1.0  2017 1:2017 1:
22am   

 

 Eosinophils # (Auto)  0.2  10^3/uL     0.0-0.3  2017 1:07am  2017 1
:22am   

 

 Basophils # (Auto)  0.1  10^3/uL     0.0-0.1  2017 1:2017 1:
22am   

 

 Neutrophils % (Manual)  70  %        2017 1:2017 1:36am   

 

 Band Neutrophils  0  %        2017 1:2017 1:36am   

 

 Lymphocytes % (Manual)  25  %        2017 1:2017 1:36am   

 

 Monocytes % (Manual)  4  %        2017 1:072017 1:36am   

 

 Eosinophils % (Manual)  1  %        2017 1:07am  2017 1:36am   

 

 Basophils % (Manual)  0  %        2017 1:07am  2017 1:36am   

 

 Blood Morphology Comment  NORMAL           2017 1:07am  2017 1:
36am   

 

 Urine Color  OTHER     *     2017 12:2017 12:50am  SPECIMEN 
IS BLUE/TEAL IN COLOR WHICH MAY CAUSE COLOR 

INTERFERENCE WITH DIPSTICK ANALYSIS.

 

 Urine Clarity  CLEAR           2017 12:25am  2017 12:50am   

 

 Urine pH  6.5        5-9  2017 12:am  2017 12:50am   

 

 Urine Specific Gravity  1.025     *  1.016-1.022  2017 12:25am  2017 12:50am   

 

 Urine Protein  2+     *  NEGATIVE  2017 12:25am  2017 12:50am   

 

 Urine Glucose (UA)  NEGATIVE        NEGATIVE  2017 12:25am  2017 12
:50am   

 

 Urine RBC (Auto)  2+     *  NEGATIVE  2017 12:25am  2017 12:50am   

 

 Urine Ketones  1+     *  NEGATIVE  2017 12:25am  2017 12:50am   

 

 Urine Nitrite  NEGATIVE        NEGATIVE  2017 12:25am  2017 12:
50am   

 

 Urine Bilirubin  1+     *  NEGATIVE  2017 12:25am  2017 12:50am  
CONFIRMATORY ICTOTEST IS NEGATIVE.

 

 Urine Urobilinogen  NORMAL  MG/DL     NORMAL  2017 12:25am  2017 12
:50am   

 

 Urine Leukocyte Esterase  1+     *  NEGATIVE  2017 12:25am  2017 12
:50am   

 

 Urine RBC  NONE  /HPF        2017 12:25am  2017 12:50am  
MICROSCOPIC ANALYSIS PERFORMED ON 1 ML OF UNSPUN SPECIMEN 

DUE TO LOW VOLUME.

 

 Urine WBC  NONE  /HPF        2017 12:25am  2017 12:50am   

 

 Urine Bacteria  TRACE  /HPF        2017 12:25am  2017 12:50am   

 

 Urine Squamous Epithelial Cells  5-10  /HPF        2017 12:25am  2017 12:50am   

 

 Urine Crystals  PRESENT  /LPF  *     2017 12:252017 12:50am   

 

 Urine Calcium Oxalate Crystals  MODERATE  /LPF  *     2017 12:25am   12:50am   

 

 Urine Casts  NONE  /LPF        2017 12:25am  2017 12:50am   

 

 Urine Mucus  NEGATIVE  /LPF        2017 12:252017 12:50am   

 

 Urine Culture Indicated  YES           2017 12:2017 12:50am 
  

 

 Sodium Level  138  MMOL/L     135-145  2017 1:2017 1:36am   

 

 Potassium Level  3.8  MMOL/L     3.6-5.0  2017 1:2017 1:36am
   

 

 Chloride Level  104  MMOL/L       2017 1:2017 1:36am   

 

 Carbon Dioxide Level  21  MMOL/L     21-32  2017 1:2017 1:
36am   

 

 Anion Gap  13  MMOL/L     5-14  2017 1:2017 1:36am   

 

 Blood Urea Nitrogen  12  MG/DL     7-18  2017 1:2017 1:36am 
  

 

 Creatinine  1.12  MG/DL     0.60-1.30  2017 1:2017 1:36am   

 

 BUN/Creatinine Ratio  11           2017 1:2017 1:36am   

 

 Estimat Glomerular Filtration Rate  53           2017 1:2017 
1:36am                    GFR INTERPRETIVE DATA  

  

         UNITS FOR ESTIMATED GFR (eGFR): mL/min/1.73 M2  

         REFERENCE RANGE FOR ESTIMATED GFR (eGFR)  

                                          eGFR  

         NORMAL eGFR                       >60  

         MODERATELY DECREASED eGFR          30-59  

         SEVERLY DECREASED eGFR             15-29  

         KIDNEY FAILURE                    <15 (OR DIALYSIS)  

 

 Glucose Level  109  MG/DL  H    2017 1:072017 1:36am   

 

 Calcium Level  9.1  MG/DL     8.5-10.1  2017 1:072017 1:36am   

 

 Total Bilirubin  0.3  MG/DL     0.1-1.0  2017 1:072017 1:36am 
  

 

 Alkaline Phosphatase  76  U/L       2017 1:07am  2017 1:36am
   

 

 Aspartate Amino Transf (AST/SGOT)  17  U/L     5-34  2017 1:072017 1:36am   

 

 Alanine Aminotransferase (ALT/SGPT)  22  U/L     0-55  2017 1:07 1:36am   

 

 Total Protein  7.3  G/DL     6.4-8.2  2017 1:072017 1:36am   

 

 Albumin  4.0  G/DL     3.2-4.5  2017 1:07am  2017 1:36am   

 

 Lipase  14  U/L     8-78  2017 1:072017 1:36am   

 

 C-Reactive Protein High Sensitivity  4.26  MG/DL  H  0.00-0.50  2017 1:
07am  2017 1:36am   





Pending Laboratory Results







 Test Name  Collection Date/Time

 

     

 

     

 

     

 

     

 

     

 

     

 

     

 

     

 

     

 

     

 

     

 

     

 

     

 

     

 

     

 

     

 

     

 

     

 

     

 

     

 

     

 

     

 

     

 

     

 

     

 

     

 

     

 

     

 

     

 

     

 

     

 

     

 

     

 

     

 

     

 

     

 

     

 

     

 

     

 

     

 

     

 

     

 

     

 

     

 

     

 

     

 

     

 

     

 

     

 

     

 

     

 

     

 

     

 

     

 

     

 

     

 

     







Procedures

No known history of procedures.



Encounters







 Encounter  Location  Arrival/Admit Date  Discharge/Depart Date  Attending 
Provider

 

 Departed Emergency Room  [facility]  17 7:34am   11:21am  BRYON GRIGGS MD

 

 Registered Clinic  [facility]  17 5:37am     TAWIL, ELIAS A MD

 

 Departed Emergency Room  [facility]  17 12:17am   3:07am  SHANNAN DAVID MD











 Recent Diagnosis

## 2017-02-14 NOTE — DISCHARGE INST-UROLOGY
Discharge Inst-Urology


Discharge Medications


New, Converted, or Re-newed RX:  RX on Chart





Patient Instructions/Follow Up


Plan


Office friday 11am for cysto and DC stent





Stone for analysis post seen by patient, parents has it





Increase oral fluids for 48 hours and then as needed.





Diet and Activity as tolerated.





If questions or concerns contact your physician 


Or seek help at emergency department.








TAWIL,ELIAS A MD Feb 14, 2017 7:54 am

## 2017-02-14 NOTE — PROGRESS NOTE-POST OPERATIVE
Post-Operative Progess Note


Pre-Operative Diagnosis


LT DISTAL URETERAL STONE





Post-Operative Diagnosis





SAME





Post-Op Procedure Note


Date of Procedure:  Feb 14, 2017


Name of Procedure:  


CYSTO, LT URETEROSCOPY WITH STONE BASKET, LT RETROGRADE UROGRAM AND JJ


STENT


Anesthesia Type


GENERAL


Specimen(s) collected


STONE FOR ANALYSIS








TAWIL,ELIAS A MD Feb 14, 2017 7:53 am

## 2017-02-15 NOTE — OPERATIVE REPORT
PROCEDURE PHYSICIAN:   ELIAS TAWIL

 

DATE OF PROCEDURE:  

02/14/2017

 

PREOPERATIVE DIAGNOSIS:  

Left distal ureteral stone. 

 

POSTOPERATIVE DIAGNOSIS:

Left distal ureteral stone. 

 

OPERATION PERFORMED:

1.   Cystoscopy. 

2.   Left ureteroscopy with stone basket. 

3.   Double J stent insertion. 

4.   Retrograde urogram:. 

 

SURGEON:

Tawil

 

ANESTHESIA:

General. 

 

COMPLICATIONS:

None. 

 

PROCEDURE:

Under satisfactory general anesthesia, the patient in lithotomy

position, the genitalia were prepped and draped in usual sterile

fashion. Cystoscope was introduced under vision. The bladder was

essentially normal except very sluggish efflux on the left

ureteral orifice using the Foroblique lens, I dilated the left

ureteral orifice, intramural portion to accommodate a 6.9-Ethiopian

semirigid ureteroscope.  I could not get to the stone safely

enough to break it up with lithoclast because of quite a bit of

edema proximal to it and around it. I was able to pass a 3-Ethiopian

Pulido basket, engaged the stone and extracted it completely.

Before removing the stone, I injected retrograde urogram which

showed the filling defect in the distal ureter.  After removing

the stone. I repeated the retrograde again and there was no more

filling defect at all with excellent emptying of the ureter. 

Because of the long-standing stone and all the reaction in the

ureter I elected to leave a stent for a couple or 3 days. I

passed a 6-Ethiopian 26 cm double J stent, guided fluoroscopically

all the way to the left renal pelvis. The guidewire removed the

stent was seen J-ing nicely proximally, fluoroscopically and

distally endoscopically. The bladder was evacuated and cystoscope

was removed. The patient tolerated the procedure and anesthesia

well and was sent to recovery room in stable condition. 

 

 

 

Job ID: [id number]

Dictated Date: 02/14/2017 09:52:29 

Transcription Date: 02/15/2017 13:02:51 / heather

## 2017-02-18 LAB
KIDNEY STONE COMPOSITION: (no result)
KIDNEY STONE DESCRIPTION: (no result)
KIDNEY STONE WEIGHT: 11 MG
STONE NUMBER: 1

## 2017-02-22 NOTE — XMS REPORT
Continuity of Care Document

 Created on: 2017



[facility]

External Reference #: [id number]

: 1975

Sex: Female



Demographics







 Address  [address] [place]  [address]

 

 Home Phone  [phone]

 

 Preferred Language  English

 

 Marital Status  Unknown

 

 Anabaptist Affiliation  Unknown

 

 Race  Unknown

 

 Ethnic Group  Unknown





Author







 Author  [facility]

 

 Organization  [facility]

 

 Address  Unknown

 

 Phone  Unavailable







Support







 Name  Relationship  Address  Phone

 

 KIRSTIE CASTANEDA DO  Caregiver  [address] [place]  [address] [phone]

 

 TAWIL, ELIAS A MD  Caregiver  [address] [place]  [address] [phone]

 

 [facility]  Next Of Kin  [address]

[place]  [address] [phone]







Care Team Providers







 Care Team Member Name  Role  Phone

 

 KIRSTIE CASTANEDA DO  PCP  [phone]







Insurance Providers







 Payer Name  Policy Number  Subscriber Name  Relationship

 

 [employer]  [id number]  [place]  18 Self / Same As Patient







Advance Directives







 Directive  Response  Recorded Date/Time

 

 Advance Directives  No  17 6:30am

 

 Resuscitation Status  Full Code  17 6:30am







Problems

Active Problems







 Medical Problem  Onset Date  Status

 

 Left sided abdominal pain  Unknown  Acute

 

 Left ureteral stone  Unknown  Acute

 

 Ovarian cyst, left  Unknown  Acute







Medications

Current Home Medications







 Medication  Dose  Units  Route  Directions  Days/Qty  Instructions  Start Date

 

 Tamsulosin Hcl 0.4 Mg  0.4  Mg  Oral  Daily  15  TAKE ONE CAPSULE DAILY BY 
MOUTH.  17

 

 Nitrofurantoin Monohyd/M-Cryst 100 Mg  1  Tab  Oral  Twice A Day With Meals  
14  TAKE ONE CAPSULE BY MOUTH TWICE A DAY WITH A MEAL. USE ALL OF THIS 
ANTIBIOTIC AS PRESCRIBED.  17

 

 Phenazopyridine Hcl 200 Mg  1  Tab  Oral  Three Times A Day  15  MAY TAKE 200 
MG BY MOUTH UP TO THREE TIMES A DAY AS NEEDED FOR BLADDER SPASM/PAIN.  LAST 
DOSE GIVEN AT 10:46 AM 17.  17

 

 Hydrocodone/Acetaminophen 1 Each  1-2  Each  Oral  Every 4HRS as needed for 
Pain  20  MAY TAKE ONE OR TWO TABLETS BY MOUTH EVERY 4 HRS AS NEEDED FOR PAIN. 
LAST DOSE, ONE TABLET, GIVEN AT 10:47 AM 17.  17





Past Home Medications







 Medication  Directions  Ordered  Status

 

 Oxycodone Hcl/Acetaminophen 1 Each Tablet, 1 Each Oral  Every 4HRS  11  
Discontinued

 

 Omeprazole 20 Mg Tablet.dr, 20 Mg Oral  Twice A Day  11  Discontinued

 

 Famotidine (Pepcid) 20 Mg Tablet, 1 Each Oral  Twice A Day  11  
Discontinued

 

 Ciprofloxacin Hcl 250 Mg Tablet, 1 Cap Oral  Twice A Day  17  
Discontinued

 

 Methenam/Me Blue/Ba/Salicy/Hyo 1 Each Tablet, 1 Each Oral  As Directed    Discontinued

 

 Oxycodone Hcl/Acetaminophen 1 Each Tablet, 1-2 Each Oral  Every 6 Hours as 
needed for Pain  17  Discontinued

 

 Phenazopyridine Hcl 200 Mg Tablet, 1 Tab Oral  Three Times A Day as needed for 
Pain  17  Discontinued

 

 Cephalexin 500 Mg Tablet, 500 Mg Oral  Twice A Day  17  Discontinued







Social History







 Social History Problem  Response  Recorded Date/Time

 

 Alcohol Use  Rarely Uses  2017 6:30am

 

 Recreational Drug Use  No  2017 6:30am

 

 Recent Foreign Travel  No  2017 6:30am

 

 Recent Infectious Disease Exposure  No  2017 6:30am

 

 Smoking Status  Former Smoker  2017 6:30am

 

 Type Used  Cigarettes  2017 11:54am

 

 Recent Hopitalizations  No  2017 6:30am









 Query  Response  Start Date  Stop Date

 

 Smoking Status  Former Smoker      







Hospital Discharge Instructions

 

Patient Instructions

Physician Instructions

 

New, Converted, or Re-newed RX:  RX on Chart

Plan

Office friday 11am for cysto and DC stent

 

Stone for analysis post seen by patient, parents has it

 

Increase oral fluids for 48 hours and then as needed.

 

Diet and Activity as tolerated.

 

If questions or concerns contact your physician 

Or seek help at emergency department.

 

 



Plan of Care







 Discharge Date  17 11:32am

 

 Instructions/Education Provided  ANESTHESIA INSTRUCTIONS POSTOP

 

 Prescriptions  See Medication Section







Functional Status

No functional status results.



Allergies, Adverse Reactions, Alerts

No known allergies.



Immunizations

No immunization records.



Vital Signs

Acute Vital Signs





 Vital  Response  Date/Time

 

 Temperature (Fahrenheit)  97.3 degrees F (97.6 - 99.5)  2017 11:32am

 

 Temperature (Calculated Celsius)  36.09084 degrees C (36.4 - 37.5)  2017 
11:20am

 

 Temperature Source  Temporal  2017 11:32am

 

 Pulse Rate (adult)  60 bpm (60 - 90)  2017 11:32am

 

 Respiratory Rate  18 bpm (12 - 24)  2017 11:32am

 

 O2 Sat by Pulse Oximetry  98 % (88 - 100)  2017 11:32am

 

 Blood Pressure  121/64 mm Hg  2017 11:32am

 

    Blood Pressure Mean  91 mm Hg  2017 6:30am

 

 Pain      

 

    Numeric Pain Scale  2  2017 11:39am

 

    Pain Intensity  2  2017 11:20am

 

 Height (Feet)  5 feet  2017 6:30am

 

 Height (Inches)  5.00 inches  2017 6:30am

 

 Height (Calculated Centimeters)  165.261389 cm  2017 6:30am

 

 Weight (Pounds)  291 pounds  2017 6:30am

 

 Weight (Ounces)  0.0 oz  2017 6:30am

 

 Weight (Calculated Grams)  989415.38 gm  2017 6:30am

 

 Weight (Calculated Kilograms)  131.554888 kilograms  2017 6:30am

 

 Calculated BMI  48.4  2017 6:30am

 

 Capillary Refill      

 

    Capillary Refill  Less Than 3 Seconds  2017 7:39am







Results

Laboratory Results







 Test Name  Result  Units  Flags  Reference  Collection Date/Time  Result Date/
Time  Comments

 

 White Blood Count  15.0  10^3/uL  H  4.3-11.0  2017 1:07am  2017 1:
22am   

 

 Red Blood Count  4.55  10^6/uL     4.35-5.85  2017 1:07am  2017 1:
22am   

 

 Hemoglobin  12.7  G/DL     11.5-16.0  2017 1:07am  2017 1:22am   

 

 Hematocrit  37  %     35-52  2017 1:07am  2017 1:22am   

 

 Mean Corpuscular Volume  82  FL     80-99  2017 1:07am  2017 1:
22am   

 

 Mean Corpuscular Hemoglobin  28  PG     25-34  2017 1:07am  2017 1:
22am   

 

 Mean Corpuscular Hemoglobin Concent  34  G/DL     32-36  2017 1: 1:22am   

 

 Red Cell Distribution Width  14.4  %     10.0-14.5  2017 1:2017 1:22am   

 

 Platelet Count  381  10^3/uL     130-400  2017 1:2017 1:22am
   

 

 Mean Platelet Volume  9.5  FL     7.4-10.4  2017 1:2017 1:
22am   

 

 Neutrophils (%) (Auto)  76  %  H  42-75  2017 1:2017 1:22am 
  

 

 Lymphocytes (%) (Auto)  16  %     12-44  2017 1:2017 1:22am 
  

 

 Monocytes (%) (Auto)  7  %     0-12  2017 1:2017 1:22am   

 

 Eosinophils (%) (Auto)  1  %     0-10  2017 1:2017 1:22am   

 

 Basophils (%) (Auto)  0  %     0-10  2017 1:2017 1:22am   

 

 Neutrophils # (Auto)  11.3  X 10^3  H  1.8-7.8  2017 1:2017 1
:22am   

 

 Lymphocytes # (Auto)  2.5  X 10^3     1.0-4.0  2017 1:2017 1:
22am   

 

 Monocytes # (Auto)  1.0  X 10^3     0.0-1.0  2017 1:2017 1:
22am   

 

 Eosinophils # (Auto)  0.2  10^3/uL     0.0-0.3  2017 1:2017 1
:22am   

 

 Basophils # (Auto)  0.1  10^3/uL     0.0-0.1  2017 1:2017 1:
22am   

 

 Neutrophils % (Manual)  70  %        2017 1:2017 1:36am   

 

 Band Neutrophils  0  %        2017 1:07am  2017 1:36am   

 

 Lymphocytes % (Manual)  25  %        2017 1:07am  2017 1:36am   

 

 Monocytes % (Manual)  4  %        2017 1:07am  2017 1:36am   

 

 Eosinophils % (Manual)  1  %        2017 1:am  2017 1:36am   

 

 Basophils % (Manual)  0  %        2017 1:am  2017 1:36am   

 

 Blood Morphology Comment  NORMAL           2017 1:am  2017 1:
36am   

 

 Urine Color  OTHER     *     2017 12:am  2017 12:50am  SPECIMEN 
IS BLUE/TEAL IN COLOR WHICH MAY CAUSE COLOR 

INTERFERENCE WITH DIPSTICK ANALYSIS.

 

 Urine Clarity  CLEAR           2017 12:am  2017 12:50am   

 

 Urine pH  6.5        5-9  2017 12:25am  2017 12:50am   

 

 Urine Specific Gravity  1.025     *  1.016-1.022  2017 12:25am  2017 12:50am   

 

 Urine Protein  2+     *  NEGATIVE  2017 12:25am  2017 12:50am   

 

 Urine Glucose (UA)  NEGATIVE        NEGATIVE  2017 12:am  2017 12
:50am   

 

 Urine RBC (Auto)  2+     *  NEGATIVE  2017 12:25am  2017 12:50am   

 

 Urine Ketones  1+     *  NEGATIVE  2017 12:25am  2017 12:50am   

 

 Urine Nitrite  NEGATIVE        NEGATIVE  2017 12:25am  2017 12:
50am   

 

 Urine Bilirubin  1+     *  NEGATIVE  2017 12:25am  2017 12:50am  
CONFIRMATORY ICTOTEST IS NEGATIVE.

 

 Urine Urobilinogen  NORMAL  MG/DL     NORMAL  2017 12:25am  2017 12
:50am   

 

 Urine Leukocyte Esterase  1+     *  NEGATIVE  2017 12:25am  2017 12
:50am   

 

 Urine RBC  NONE  /HPF        2017 12:25am  2017 12:50am  
MICROSCOPIC ANALYSIS PERFORMED ON 1 ML OF UNSPUN SPECIMEN 

DUE TO LOW VOLUME.

 

 Urine WBC  NONE  /HPF        2017 12:25am  2017 12:50am   

 

 Urine Bacteria  TRACE  /HPF        2017 12:25am  2017 12:50am   

 

 Urine Squamous Epithelial Cells  5-10  /HPF        2017 12:25am  2017 12:50am   

 

 Urine Crystals  PRESENT  /LPF  *     2017 12:25am  2017 12:50am   

 

 Urine Calcium Oxalate Crystals  MODERATE  /LPF  *     2017 12:25am   12:50am   

 

 Urine Casts  NONE  /LPF        2017 12:25am  2017 12:50am   

 

 Urine Mucus  NEGATIVE  /LPF        2017 12:25am  2017 12:50am   

 

 Urine Culture Indicated  YES           2017 12:25am  2017 12:50am 
  

 

 Sodium Level  138  MMOL/L     135-145  2017 1:07am  2017 1:36am   

 

 Potassium Level  3.8  MMOL/L     3.6-5.0  2017 1:07am  2017 1:36am
   

 

 Chloride Level  104  MMOL/L       2017 1:07am  2017 1:36am   

 

 Carbon Dioxide Level  21  MMOL/L     21-32  2017 1:07am  2017 1:
36am   

 

 Anion Gap  13  MMOL/L     5-14  2017 1:07am  2017 1:36am   

 

 Blood Urea Nitrogen  12  MG/DL     7-18  2017 1:07am  2017 1:36am 
  

 

 Creatinine  1.12  MG/DL     0.60-1.30  2017 1:07am  2017 1:36am   

 

 BUN/Creatinine Ratio  11           2017 1:072017 1:36am   

 

 Estimat Glomerular Filtration Rate  53           2017 1:072017 
1:36am                    GFR INTERPRETIVE DATA  

  

         UNITS FOR ESTIMATED GFR (eGFR): mL/min/1.73 M2  

         REFERENCE RANGE FOR ESTIMATED GFR (eGFR)  

                                          eGFR  

         NORMAL eGFR                       >60  

         MODERATELY DECREASED eGFR          30-59  

         SEVERLY DECREASED eGFR             15-29  

         KIDNEY FAILURE                    <15 (OR DIALYSIS)  

 

 Glucose Level  109  MG/DL  H    2017 1:07am  2017 1:36am   

 

 Calcium Level  9.1  MG/DL     8.5-10.1  2017 1:07am  2017 1:36am   

 

 Total Bilirubin  0.3  MG/DL     0.1-1.0  2017 1:07am  2017 1:36am 
  

 

 Alkaline Phosphatase  76  U/L       2017 1:07am  2017 1:36am
   

 

 Aspartate Amino Transf (AST/SGOT)  17  U/L     5-34  2017 1:07am  2017 1:36am   

 

 Alanine Aminotransferase (ALT/SGPT)  22  U/L     0-55  2017 1:07am   1:36am   

 

 Total Protein  7.3  G/DL     6.4-8.2  2017 1:07am  2017 1:36am   

 

 Albumin  4.0  G/DL     3.2-4.5  2017 1:07am  2017 1:36am   

 

 Lipase  14  U/L     8-78  2017 1:07am  2017 1:36am   

 

 C-Reactive Protein High Sensitivity  4.26  MG/DL  H  0.00-0.50  2017 1:
07am  2017 1:36am   





Pending Laboratory Results







 Test Name  Collection Date/Time

 

     

 

     

 

     

 

     

 

     

 

     

 

     

 

     

 

     

 

     

 

     

 

     

 

     

 

     

 

     

 

     

 

     

 

     

 

     

 

     

 

     

 

     

 

     

 

     

 

     

 

     

 

     

 

     

 

     

 

     

 

     

 

     

 

     

 

     

 

     

 

     

 

     

 

     

 

     

 

     

 

     

 

     

 

     

 

     

 

     

 

     

 

     

 

     

 

     

 

     

 

     

 

     

 

     

 

     

 

     

 

     

 

     

 

     

 

     

 

     

 

     





Pending Microbiology Results







 Procedure  Source  Collection Date/Time

 

        

 

        

 

        

 

        







Procedures







 Procedure  Status  Date  Provider(s)

 

 Cystoscopy  Completed  17  TAWIL,ELIAS A MD







Encounters







 Encounter  Location  Arrival/Admit Date  Discharge/Depart Date  Attending 
Provider

 

 Departed Surgical Day Care  [facility]  17 6:25am   11:32am  TAWIL, ELIAS A MD

 

 Registered Clinic  [facility]  02/10/17 9:01am     SOFIA CALDERON DO

 

 Departed Emergency Room  [facility]  17 7:34am   11:21am  BRYON GRIGGS MD

 

 Departed Clinic  [facility]  17 5:37am  17 12:
08pm  TAWIL, ELIAS A MD

 

 Departed Emergency Room  [facility]  17 12:17am   3:07am  SHANNAN DAVID MD

## 2017-02-24 ENCOUNTER — HOSPITAL ENCOUNTER (OUTPATIENT)
Dept: HOSPITAL 75 - LAB | Age: 42
LOS: 88 days | Discharge: HOME | End: 2017-05-23
Attending: UROLOGY
Payer: COMMERCIAL

## 2017-02-24 DIAGNOSIS — N20.9: Primary | ICD-10-CM

## 2017-02-24 PROCEDURE — 84560 ASSAY OF URINE/URIC ACID: CPT

## 2017-02-24 PROCEDURE — 84392 ASSAY OF URINE SULFATE: CPT

## 2017-02-24 PROCEDURE — 83735 ASSAY OF MAGNESIUM: CPT

## 2017-02-24 PROCEDURE — 82140 ASSAY OF AMMONIA: CPT

## 2017-02-24 PROCEDURE — 83986 ASSAY PH BODY FLUID NOS: CPT

## 2017-02-24 PROCEDURE — 82507 ASSAY OF CITRATE: CPT

## 2017-02-24 PROCEDURE — 83945 ASSAY OF OXALATE: CPT

## 2017-02-24 PROCEDURE — 36415 COLL VENOUS BLD VENIPUNCTURE: CPT

## 2017-02-24 PROCEDURE — 84300 ASSAY OF URINE SODIUM: CPT

## 2017-02-24 PROCEDURE — 84133 ASSAY OF URINE POTASSIUM: CPT

## 2017-02-24 PROCEDURE — 82570 ASSAY OF URINE CREATININE: CPT

## 2017-02-24 PROCEDURE — 84105 ASSAY OF URINE PHOSPHORUS: CPT

## 2017-02-24 PROCEDURE — 82340 ASSAY OF CALCIUM IN URINE: CPT

## 2017-03-01 LAB
STONE RISK AMMONIUM: 32 MEQ/24HR (ref 14–62)
STONE RISK BRUSHITE: 0.92 (ref ?–2)
STONE RISK CA OXALATE: 3.85 (ref ?–2)
STONE RISK CALCIUM: 147 MG/DAY (ref ?–250)
STONE RISK CITRATE: 327 MG/DAY (ref 320–?)
STONE RISK CREATININE: 1382 MG/DAY (ref 600–1800)
STONE RISK MAGNESIUM: 72 MG/DAY (ref 60–?)
STONE RISK OXALATE: 30 MG/DAY (ref ?–45)
STONE RISK PH: 5.3 (ref 5.5–7)
STONE RISK PHOSPHOROUS: 748 MG/DAY (ref ?–1100)
STONE RISK POTASSIUM: 34 MEQ/24HR (ref 19–135)
STONE RISK SODIUM URATES: 0.69 (ref ?–2)
STONE RISK SODIUM: 86 MEQ/24HR (ref ?–200)
STONE RISK STRUVITE: 0.14 (ref ?–75)
STONE RISK SULFITE: 13 MMOL/DAY (ref ?–30)
STONE RISK TOTAL VOLUME: 0.73 L/DAY (ref 2–?)
STONE RISK URIC ACID SAT: 1.79 (ref ?–2)
STONE RISK URIC ACID: 112 MG/DAY (ref ?–700)

## 2018-02-08 ENCOUNTER — HOSPITAL ENCOUNTER (OUTPATIENT)
Dept: HOSPITAL 75 - 4TH | Age: 43
Setting detail: OBSERVATION
LOS: 2 days | Discharge: HOME | End: 2018-02-10
Attending: FAMILY MEDICINE | Admitting: FAMILY MEDICINE
Payer: COMMERCIAL

## 2018-02-08 VITALS — DIASTOLIC BLOOD PRESSURE: 71 MMHG | SYSTOLIC BLOOD PRESSURE: 109 MMHG

## 2018-02-08 VITALS — WEIGHT: 291 LBS | HEIGHT: 65 IN | BODY MASS INDEX: 48.48 KG/M2

## 2018-02-08 VITALS — DIASTOLIC BLOOD PRESSURE: 65 MMHG | SYSTOLIC BLOOD PRESSURE: 125 MMHG

## 2018-02-08 VITALS — SYSTOLIC BLOOD PRESSURE: 116 MMHG | DIASTOLIC BLOOD PRESSURE: 82 MMHG

## 2018-02-08 DIAGNOSIS — J06.9: ICD-10-CM

## 2018-02-08 DIAGNOSIS — R06.1: ICD-10-CM

## 2018-02-08 DIAGNOSIS — R06.00: ICD-10-CM

## 2018-02-08 DIAGNOSIS — J20.9: Primary | ICD-10-CM

## 2018-02-08 DIAGNOSIS — Z23: ICD-10-CM

## 2018-02-08 LAB
ALBUMIN SERPL-MCNC: 3.7 GM/DL (ref 3.2–4.5)
ALP SERPL-CCNC: 65 U/L (ref 40–136)
ALT SERPL-CCNC: 23 U/L (ref 0–55)
BASOPHILS # BLD AUTO: 0 10^3/UL (ref 0–0.1)
BASOPHILS NFR BLD AUTO: 0 % (ref 0–10)
BILIRUB SERPL-MCNC: 0.2 MG/DL (ref 0.1–1)
BUN/CREAT SERPL: 9
CALCIUM SERPL-MCNC: 8.9 MG/DL (ref 8.5–10.1)
CHLORIDE SERPL-SCNC: 104 MMOL/L (ref 98–107)
CO2 SERPL-SCNC: 23 MMOL/L (ref 21–32)
CREAT SERPL-MCNC: 0.87 MG/DL (ref 0.6–1.3)
EOSINOPHIL # BLD AUTO: 0.1 10^3/UL (ref 0–0.3)
EOSINOPHIL NFR BLD AUTO: 1 % (ref 0–10)
ERYTHROCYTE [DISTWIDTH] IN BLOOD BY AUTOMATED COUNT: 14.8 % (ref 10–14.5)
ERYTHROCYTE [SEDIMENTATION RATE] IN BLOOD: 45 MM/HR (ref 0–20)
GFR SERPLBLD BASED ON 1.73 SQ M-ARVRAT: > 60 ML/MIN
GLUCOSE SERPL-MCNC: 116 MG/DL (ref 70–105)
HCT VFR BLD CALC: 39 % (ref 35–52)
HGB BLD-MCNC: 13.1 G/DL (ref 11.5–16)
LYMPHOCYTES # BLD AUTO: 1.6 X 10^3 (ref 1–4)
LYMPHOCYTES NFR BLD AUTO: 25 % (ref 12–44)
MANUAL DIFFERENTIAL PERFORMED BLD QL: NO
MCH RBC QN AUTO: 28 PG (ref 25–34)
MCHC RBC AUTO-ENTMCNC: 34 G/DL (ref 32–36)
MCV RBC AUTO: 82 FL (ref 80–99)
MONOCYTES # BLD AUTO: 0.8 X 10^3 (ref 0–1)
MONOCYTES NFR BLD AUTO: 13 % (ref 0–12)
NEUTROPHILS # BLD AUTO: 3.8 X 10^3 (ref 1.8–7.8)
NEUTROPHILS NFR BLD AUTO: 61 % (ref 42–75)
PLATELET # BLD: 235 10^3/UL (ref 130–400)
PMV BLD AUTO: 9.2 FL (ref 7.4–10.4)
POTASSIUM SERPL-SCNC: 3.4 MMOL/L (ref 3.6–5)
PROT SERPL-MCNC: 7.4 GM/DL (ref 6.4–8.2)
RBC # BLD AUTO: 4.72 10^6/UL (ref 4.35–5.85)
SODIUM SERPL-SCNC: 136 MMOL/L (ref 135–145)
WBC # BLD AUTO: 6.3 10^3/UL (ref 4.3–11)

## 2018-02-08 PROCEDURE — 94640 AIRWAY INHALATION TREATMENT: CPT

## 2018-02-08 PROCEDURE — 36415 COLL VENOUS BLD VENIPUNCTURE: CPT

## 2018-02-08 PROCEDURE — 94760 N-INVAS EAR/PLS OXIMETRY 1: CPT

## 2018-02-08 PROCEDURE — 85025 COMPLETE CBC W/AUTO DIFF WBC: CPT

## 2018-02-08 PROCEDURE — 71046 X-RAY EXAM CHEST 2 VIEWS: CPT

## 2018-02-08 PROCEDURE — 85652 RBC SED RATE AUTOMATED: CPT

## 2018-02-08 PROCEDURE — 99211 OFF/OP EST MAY X REQ PHY/QHP: CPT

## 2018-02-08 PROCEDURE — 84443 ASSAY THYROID STIM HORMONE: CPT

## 2018-02-08 PROCEDURE — 85379 FIBRIN DEGRADATION QUANT: CPT

## 2018-02-08 PROCEDURE — 80053 COMPREHEN METABOLIC PANEL: CPT

## 2018-02-08 RX ADMIN — IPRATROPIUM BROMIDE AND ALBUTEROL SULFATE SCH ML: .5; 3 SOLUTION RESPIRATORY (INHALATION) at 23:09

## 2018-02-08 RX ADMIN — METHYLPREDNISOLONE SODIUM SUCCINATE SCH MG: 125 INJECTION, POWDER, FOR SOLUTION INTRAMUSCULAR; INTRAVENOUS at 17:57

## 2018-02-08 RX ADMIN — SODIUM CHLORIDE SCH MLS/HR: 900 INJECTION, SOLUTION INTRAVENOUS at 12:42

## 2018-02-08 RX ADMIN — SODIUM CHLORIDE SCH MLS/HR: 900 INJECTION, SOLUTION INTRAVENOUS at 20:48

## 2018-02-08 RX ADMIN — METHYLPREDNISOLONE SODIUM SUCCINATE SCH MG: 40 INJECTION, POWDER, FOR SOLUTION INTRAMUSCULAR; INTRAVENOUS at 23:43

## 2018-02-08 RX ADMIN — IPRATROPIUM BROMIDE AND ALBUTEROL SULFATE SCH ML: .5; 3 SOLUTION RESPIRATORY (INHALATION) at 12:29

## 2018-02-08 RX ADMIN — METHYLPREDNISOLONE SODIUM SUCCINATE SCH MG: 125 INJECTION, POWDER, FOR SOLUTION INTRAMUSCULAR; INTRAVENOUS at 12:42

## 2018-02-08 RX ADMIN — IPRATROPIUM BROMIDE AND ALBUTEROL SULFATE SCH ML: .5; 3 SOLUTION RESPIRATORY (INHALATION) at 19:25

## 2018-02-08 RX ADMIN — ENOXAPARIN SODIUM SCH MG: 100 INJECTION SUBCUTANEOUS at 13:08

## 2018-02-08 RX ADMIN — FAMOTIDINE SCH MG: 20 TABLET, FILM COATED ORAL at 20:48

## 2018-02-08 NOTE — HISTORY & PHYSICIAL
History of Present Illness


History of Present Illness


Reason for visit/HPI


This is a 42 year old female who presented to my office to see the nurse 

practitioner with complaint of cough, shortness of air and wheezing for 3 to 5 

days.  She was very dyspneic on exam but her oxygen saturation was 97% on room 

air and she had no wheezing on physical exam.  She was also afebrile.  She had 

labored breathing so she was given a breathing treatment which did not help her 

symptoms.  It was decided to admit her for direct admission for IV solumedrol, 

routine nebulizer treatments and further evaluation.


Date of Admission


2018 at 11:55 am


Date Seen by Provider:  2018


Time Seen by Provider:  11:30


I consulted on this patient on


18


 18:26


Attending Physician


Lauren Soler DO


Admitting Physician


Lauren Soler DO


Consult








Allergies and Home Medications


Allergies


Coded Allergies:  


     No Known Drug Allergies (Unverified , 3/1/11)





Home Medications


No Active Prescriptions or Reported Meds





Past Medical-Social-Family Hx


Patient Social History


Alcohol Use:  Rarely Uses


Number of Drinks Today:  0


Alcohol Beverage of Choice:  Wine


Recreational Drug Use:  No


Smoking Status:  Former Smoker


Former Smoker, Quit:  1988


Type Used:  Cigarettes


Physical Abuse Screen:  No


Sexual Abuse:  No


Recent Foreign Travel:  No


Contact w/other who traveled:  No


Recent Hopitalizations:  No


Recent Infectious Disease Expo:  No





Immunizations Up To Date


Tetanus Booster (TDap):  Unknown





Seasonal Allergies


Seasonal Allergies:  Yes (sometimes)





Surgeries


Yes (BACK SURGERY)


Gallbladder, Orthopedic





Respiratory


No





Cardiovascular


No





Neurological


No





Reproductive System


Hx Reproductive Disorders:  No





Genitourinary


Yes


Kidney Stones





Gastrointestinal


No





Musculoskeletal


No





Endocrine


History of Endocrine Disorders:  Yes (obesity)





Cancer


No





Psychosocial


History of Psychiatric Problem:  No





Integumentary


History of Skin or Integumenta:  No





Blood Transfusions


History of Blood Disorders:  No





Family Medical History


Significant Family History:  COPD, Hypertension


Family Hx:  


Cardiovascular disease


  17 FATHER


Respiratory disorder


  17 MOTHER (COPD)





Constitutional:  weakness


EENTM:  hoarseness, throat pain, throat swelling


Respiratory:  cough, dyspnea on exertion, short of breath, stridor, wheezing


Cardiovascular:  No no symptoms reported, No see HPI, No chest pain, No edema, 

No Hx of Intervention, No palpitations, No syncope, No vascular heart diseas, 

No other


Gastrointestinal:  No RUQ, No LUQ, No RLQ, No LLQ, No no symptoms reported, No 

see HPI, No abdominal pain, No constipation, No diarrhea, No dysphagia, No 

hematemesis, No heartburn, No jaundice, No loss of appetite, No melena, No 

nausea, No vomiting, No other


Genitourinary:  No no symptoms reported, No see HPI, No decreased output, No 

discharge, No dysuria, No frequency, No hematuria, No hesitancy, No incontinence

, No nocturia, No pain, No other


Musculoskeletal:  No no symptoms reported, No see HPI, No back pain, No gout, 

No joint pain, No joint swelling, No muscle pain, No muscle stiffness, No 

muscle cramps, No muscle twitching, No muscle weakness, No neck pain, No other


Skin:  No no symptoms reported, No see HPI, No change in color, No change in 

hair/nails, No dryness, No hx of skin cancer, No lesions, No lumps, No pruritus

, No rash, No other


Psychiatric/Neurological:  Anxiety, Weakness





Physical Exam


Vital Signs





Vital Signs - First Documented




















Capillary Refill :


General Appearance:  Severe Distress (labored breathing, red faced, anxious and 

tearful)


HEENT:  Pharyngeal Erythema


Neck:  Supple


Respiratory:  Lungs Clear


Cardiovascular:  Regular Rate, Rhythm


Gastrointestinal:  Normal Bowel Sounds, Non Tender, Soft


Rectal:  Deferred


Back:  No CVA Tenderness


Extremity:  Non Tender, No Calf Tenderness, No Pedal Edema


Neurologic/Psychiatric:  Alert, Oriented x3


Skin:  Warm/Dry


Comments


Laboratory Tests


18 13:09: 


White Blood Count 6.3, Red Blood Count 4.72, Hemoglobin 13.1, Hematocrit 39, 

Mean Corpuscular Volume 82, Mean Corpuscular Hemoglobin 28, Mean Corpuscular 

Hemoglobin Concent 34, Red Cell Distribution Width 14.8H, Platelet Count 235, 

Mean Platelet Volume 9.2, Neutrophils (%) (Auto) 61, Lymphocytes (%) (Auto) 25, 

Monocytes (%) (Auto) 13H, Eosinophils (%) (Auto) 1, Basophils (%) (Auto) 0, 

Neutrophils # (Auto) 3.8, Lymphocytes # (Auto) 1.6, Monocytes # (Auto) 0.8, 

Eosinophils # (Auto) 0.1, Basophils # (Auto) 0.0, Erythrocyte Sedimentation 

Rate 45H, D-Dimer 0.41, Sodium Level 136, Potassium Level 3.4L, Chloride Level 

104, Carbon Dioxide Level 23, Anion Gap 9, Blood Urea Nitrogen 8, Creatinine 

0.87, Estimat Glomerular Filtration Rate > 60, BUN/Creatinine Ratio 9, Glucose 

Level 116H, Calcium Level 8.9, Total Bilirubin 0.2, Aspartate Amino Transf (AST/

SGOT) 20, Alanine Aminotransferase (ALT/SGPT) 23, Alkaline Phosphatase 65, 

Total Protein 7.4, Albumin 3.7, Thyroid Stimulating Hormone (TSH) 1.40





Assessment/Plan


Assessment and Plan


1.  Acute Dyspnea/Stridor/Wheezing--Admit directly to medical floor and check 

CXR, lab and start IV solumedrol and routine SVNS with duoneb


2.  Acute URI--check lab and monitor temperature


3.  Anxiety--will see if improves with decrease work of breathing


Problems:  





Clinical Quality Measures


DVT/VTE Risk/Contraindication:


Risk Factor Score Per Nursin


RFS Level Per Nursing on Admit:  2=Moderate











LAUREN SOLER DO 2018 6:31 pm

## 2018-02-08 NOTE — DIAGNOSTIC IMAGING REPORT
INDICATION: Respiratory distress, lower respiratory infection.



EXAM: PA and lateral chest.



FINDINGS:  

The heart size and pulmonary vascularity are normal. The lungs

are clear. There are no effusions or pneumothoraces.



IMPRESSION:



Negative chest.



Dictated by: 



  Dictated on workstation # [id number]

## 2018-02-09 VITALS — DIASTOLIC BLOOD PRESSURE: 58 MMHG | SYSTOLIC BLOOD PRESSURE: 125 MMHG

## 2018-02-09 VITALS — SYSTOLIC BLOOD PRESSURE: 115 MMHG | DIASTOLIC BLOOD PRESSURE: 83 MMHG

## 2018-02-09 VITALS — DIASTOLIC BLOOD PRESSURE: 59 MMHG | SYSTOLIC BLOOD PRESSURE: 116 MMHG

## 2018-02-09 VITALS — DIASTOLIC BLOOD PRESSURE: 68 MMHG | SYSTOLIC BLOOD PRESSURE: 130 MMHG

## 2018-02-09 VITALS — DIASTOLIC BLOOD PRESSURE: 68 MMHG | SYSTOLIC BLOOD PRESSURE: 126 MMHG

## 2018-02-09 VITALS — DIASTOLIC BLOOD PRESSURE: 73 MMHG | SYSTOLIC BLOOD PRESSURE: 104 MMHG

## 2018-02-09 RX ADMIN — ENOXAPARIN SODIUM SCH MG: 100 INJECTION SUBCUTANEOUS at 13:01

## 2018-02-09 RX ADMIN — GUAIFENESIN AND CODEINE PHOSPHATE PRN ML: 100; 10 SOLUTION ORAL at 13:01

## 2018-02-09 RX ADMIN — SODIUM CHLORIDE SCH MLS/HR: 900 INJECTION, SOLUTION INTRAVENOUS at 07:01

## 2018-02-09 RX ADMIN — IPRATROPIUM BROMIDE AND ALBUTEROL SULFATE SCH ML: .5; 3 SOLUTION RESPIRATORY (INHALATION) at 13:42

## 2018-02-09 RX ADMIN — METHYLPREDNISOLONE SODIUM SUCCINATE SCH MG: 40 INJECTION, POWDER, FOR SOLUTION INTRAMUSCULAR; INTRAVENOUS at 05:45

## 2018-02-09 RX ADMIN — FAMOTIDINE SCH MG: 20 TABLET, FILM COATED ORAL at 08:29

## 2018-02-09 RX ADMIN — IPRATROPIUM BROMIDE AND ALBUTEROL SULFATE SCH ML: .5; 3 SOLUTION RESPIRATORY (INHALATION) at 19:34

## 2018-02-09 RX ADMIN — IPRATROPIUM BROMIDE AND ALBUTEROL SULFATE SCH ML: .5; 3 SOLUTION RESPIRATORY (INHALATION) at 10:38

## 2018-02-09 RX ADMIN — KETOROLAC TROMETHAMINE PRN MG: 30 INJECTION, SOLUTION INTRAMUSCULAR; INTRAVENOUS at 21:07

## 2018-02-09 RX ADMIN — IPRATROPIUM BROMIDE AND ALBUTEROL SULFATE SCH ML: .5; 3 SOLUTION RESPIRATORY (INHALATION) at 05:43

## 2018-02-09 RX ADMIN — IPRATROPIUM BROMIDE AND ALBUTEROL SULFATE SCH ML: .5; 3 SOLUTION RESPIRATORY (INHALATION) at 06:00

## 2018-02-09 RX ADMIN — METHYLPREDNISOLONE SODIUM SUCCINATE SCH MG: 40 INJECTION, POWDER, FOR SOLUTION INTRAMUSCULAR; INTRAVENOUS at 21:08

## 2018-02-09 RX ADMIN — GUAIFENESIN AND CODEINE PHOSPHATE PRN ML: 100; 10 SOLUTION ORAL at 21:07

## 2018-02-09 RX ADMIN — FAMOTIDINE SCH MG: 20 TABLET, FILM COATED ORAL at 21:07

## 2018-02-09 RX ADMIN — IPRATROPIUM BROMIDE AND ALBUTEROL SULFATE SCH ML: .5; 3 SOLUTION RESPIRATORY (INHALATION) at 22:37

## 2018-02-09 RX ADMIN — BUDESONIDE SCH MG: 0.5 SUSPENSION RESPIRATORY (INHALATION) at 19:34

## 2018-02-09 NOTE — PROGRESS NOTE (SOAP)
Subjective


Date Seen by Provider:  2018


Time Seen by Provider:  11:29


Subjective/Events-last exam


Fwup acute dyspnea, tracheobronchitis with stridor and hoarseness, acute URI.  C

/O headache from nebulizer treatments and more sinus congestion today.  States 

does feel quite as swollen in upper airways and throat.





Objective


Exam





Vital Signs








  Date Time  Temp Pulse Resp B/P (MAP) Pulse Ox O2 Delivery O2 Flow Rate FiO2


 


18 10:38     96 Room Air  


 


18 08:00 96.4 99 20 130/68 (88) 93 Room Air  


 


18 06:00     96 Room Air  


 


18 04:00 98.4 94 19 126/68 (87) 98 Room Air  


 


18 00:00 96.6 118 16 104/73 (83) 96 Room Air  


 


18 23:09     98 Room Air  


 


18 20:26     97 Room Air  


 


18 20:00 96.9 107 20 109/71 (84) 91 Room Air  


 


18 19:25     97 Room Air  


 


18 16:00 97.0 105 18 116/82 (93) 96 Room Air  


 


18 12:30     98 Room Air  


 


18 12:00     97 Room Air  


 


18 12:00 98.7 96 24 125/65 (85) 97 Room Air  














I & O 


 


 18





 07:00


 


Intake Total 1360 ml


 


Output Total 1100 ml


 


Balance 260 ml





Capillary Refill :


General Appearance:  Mild Distress


Neck:  Supple


Respiratory:  Lungs Clear, Decreased Breath Sounds


Cardiovascular:  Regular Rate, Rhythm


Gastrointestinal:  normal bowel sounds, non tender, soft


Extremity:  Non Tender, No Calf Tenderness, No Pedal Edema


Neurologic/Psychiatric:  Alert, Oriented x3





Results


Lab


Laboratory Tests


18 13:09: 


White Blood Count 6.3, Red Blood Count 4.72, Hemoglobin 13.1, Hematocrit 39, 

Mean Corpuscular Volume 82, Mean Corpuscular Hemoglobin 28, Mean Corpuscular 

Hemoglobin Concent 34, Red Cell Distribution Width 14.8H, Platelet Count 235, 

Mean Platelet Volume 9.2, Neutrophils (%) (Auto) 61, Lymphocytes (%) (Auto) 25, 

Monocytes (%) (Auto) 13H, Eosinophils (%) (Auto) 1, Basophils (%) (Auto) 0, 

Neutrophils # (Auto) 3.8, Lymphocytes # (Auto) 1.6, Monocytes # (Auto) 0.8, 

Eosinophils # (Auto) 0.1, Basophils # (Auto) 0.0, Erythrocyte Sedimentation 

Rate 45H, D-Dimer 0.41, Sodium Level 136, Potassium Level 3.4L, Chloride Level 

104, Carbon Dioxide Level 23, Anion Gap 9, Blood Urea Nitrogen 8, Creatinine 

0.87, Estimat Glomerular Filtration Rate > 60, BUN/Creatinine Ratio 9, Glucose 

Level 116H, Calcium Level 8.9, Total Bilirubin 0.2, Aspartate Amino Transf (AST/

SGOT) 20, Alanine Aminotransferase (ALT/SGPT) 23, Alkaline Phosphatase 65, 

Total Protein 7.4, Albumin 3.7, Thyroid Stimulating Hormone (TSH) 1.40





Assessment/Plan


Assessment/Plan


Assess & Plan/Chief Complaint


1.  Acute Dyspnea--oxygen saturation stable and all upper airway


2.  Acute Stridor/Tracheobronchitis--wean IV solumedrol and add pulmicort, 

continue SVNs with duoneb


3.  Acute URI--viral etiology





Clinical Quality Measures


DVT/VTE Risk/Contraindication:


Risk Factor Score Per Nursin


RFS Level Per Nursing on Admit:  2=Moderate











KIRSTIE CASTANEDA DO 2018 11:33

## 2018-02-10 VITALS — DIASTOLIC BLOOD PRESSURE: 64 MMHG | SYSTOLIC BLOOD PRESSURE: 135 MMHG

## 2018-02-10 VITALS — SYSTOLIC BLOOD PRESSURE: 137 MMHG | DIASTOLIC BLOOD PRESSURE: 67 MMHG

## 2018-02-10 VITALS — DIASTOLIC BLOOD PRESSURE: 63 MMHG | SYSTOLIC BLOOD PRESSURE: 145 MMHG

## 2018-02-10 RX ADMIN — IPRATROPIUM BROMIDE AND ALBUTEROL SULFATE SCH ML: .5; 3 SOLUTION RESPIRATORY (INHALATION) at 11:08

## 2018-02-10 RX ADMIN — GUAIFENESIN AND CODEINE PHOSPHATE PRN ML: 100; 10 SOLUTION ORAL at 07:15

## 2018-02-10 RX ADMIN — KETOROLAC TROMETHAMINE PRN MG: 30 INJECTION, SOLUTION INTRAMUSCULAR; INTRAVENOUS at 08:52

## 2018-02-10 RX ADMIN — FAMOTIDINE SCH MG: 20 TABLET, FILM COATED ORAL at 08:48

## 2018-02-10 RX ADMIN — METHYLPREDNISOLONE SODIUM SUCCINATE SCH MG: 40 INJECTION, POWDER, FOR SOLUTION INTRAMUSCULAR; INTRAVENOUS at 08:48

## 2018-02-10 RX ADMIN — IPRATROPIUM BROMIDE AND ALBUTEROL SULFATE SCH ML: .5; 3 SOLUTION RESPIRATORY (INHALATION) at 07:00

## 2018-02-10 RX ADMIN — IPRATROPIUM BROMIDE AND ALBUTEROL SULFATE SCH ML: .5; 3 SOLUTION RESPIRATORY (INHALATION) at 02:00

## 2018-02-10 RX ADMIN — BUDESONIDE SCH MG: 0.5 SUSPENSION RESPIRATORY (INHALATION) at 07:00

## 2018-02-14 NOTE — PHYSICIAN QUERY-FINAL DX
Final Diagnosis


Give Final Diagnosis


Please give Final Diagnosis











SAHIL YODER Feb 14, 2018 15:00

## 2018-04-09 NOTE — DIAGNOSTIC IMAGING REPORT
From: Rani Sung  To: Chantal Serna DO  Sent: 4/9/2018 11:19 AM CDT  Subject: Thyroid test    This message is being sent by Madelin Bowser on behalf of Rani Barnett!  I forgot to ask too, do we need to fast for the thyroid tests?    Madelin   PROCEDURE: CT abdomen and pelvis with contrast.



TECHNIQUE: Multiple contiguous axial images were obtained

through the abdomen and pelvis after administration of

intravenous contrast.



INDICATION:  Left lower quadrant pain radiating towards the back

x1 month.



CORRELATION STUDY: 3/1/2011



FINDINGS:

LOWER THORAX: Lung bases clear. Small hiatal hernia with minimal

wall thickening suggested.



LIVER: Diffuse hepatic steatosis.

GALLBLADDER: Post cholecystectomy changes. Additional displaced

clip over the right anterior mid abdomen.

SPLEEN: Unremarkable.

PANCREAS: Unremarkable.

ADRENAL GLANDS: Unremarkable.

KIDNEYS: Mild left-sided hydronephrosis owing to a small stone

in the distal left ureter measuring 3 x 1 mm. Right kidney

unremarkable.

ABDOMINAL AORTA: Unremarkable, nonaneurysmal.



GASTROINTESTINAL TRACT: No obstruction or inflammation. Appendix

projects medially in the right lower quadrant and unremarkable.

No abdominal ascites or free air.



URINARY BLADDER: Relatively decompressed not well evaluated.

REPRODUCTIVE: Uterus unremarkable. There is fullness of the left

adnexa which is likely partially collapsing left ovarian cyst

measuring approximately 3.8 cm. No free pelvic fluid.



OSSEOUS STRUCTURES: No acute abnormality.



IMPRESSION:

1. Mild left-sided obstruction owing to thin 3 x 1 mm stone

distal left ureter.

2. There is complex appearance of the left ovary which is

prominent. May be associated with collapsing ovarian cyst.

Perhaps consideration for pelvic sonography for followup.







Dictated by:



Dictated on workstation # [id number]

## 2018-05-23 ENCOUNTER — HOSPITAL ENCOUNTER (OUTPATIENT)
Dept: HOSPITAL 75 - RAD | Age: 43
End: 2018-05-23
Attending: NURSE PRACTITIONER
Payer: COMMERCIAL

## 2018-05-23 DIAGNOSIS — M17.11: Primary | ICD-10-CM

## 2018-05-23 PROCEDURE — 73562 X-RAY EXAM OF KNEE 3: CPT

## 2018-05-23 NOTE — DIAGNOSTIC IMAGING REPORT
Indication: Right knee pain



3 views of the right knee shows small osteophytes of the superior

aspect of patellofemoral joint and in the lateral tibiofemoral

joint space. There is no fracture, dislocation or pathologic

effusion.



Impression: Mild degenerative changes right knee. No acute

abnormality seen.



Dictated by: 



  Dictated on workstation # [id number]

## 2018-09-28 ENCOUNTER — HOSPITAL ENCOUNTER (OUTPATIENT)
Dept: HOSPITAL 75 - RAD | Age: 43
End: 2018-09-28
Attending: NURSE PRACTITIONER
Payer: COMMERCIAL

## 2018-09-28 DIAGNOSIS — M17.11: Primary | ICD-10-CM

## 2018-09-28 PROCEDURE — 73562 X-RAY EXAM OF KNEE 3: CPT

## 2018-09-28 NOTE — DIAGNOSTIC IMAGING REPORT
Clinical indication: Patient with right knee pain. Patient

complains of fall in May with subsequent fall in June where she

landed on right patella. Patient complains of medial and lateral

pain with intermittent swelling and catching since second fall.



Exam: X-ray of the right knee, 3 views.



Comparison: X-ray of the right knee dated 5/23/2018.



Findings: There is no acute fracture or dislocation. There is no

knee effusion. Stable mild to moderately hypertrophic

tricompartmental spurs. There is mild medial compartment

narrowing on these nonweightbearing views.



Impression:

1: There is no acute fracture or dislocation.



2:  Stable mild to moderate degenerative disease of the right 

knee.



The ordering physician's  was notified regarding

the results and availability of this report on 9/28/2018 at 1115

hrs.



Dictated by: 



  Dictated on workstation # [id number]

## 2018-10-06 ENCOUNTER — HOSPITAL ENCOUNTER (OUTPATIENT)
Dept: HOSPITAL 75 - RAD | Age: 43
End: 2018-10-06
Attending: NURSE PRACTITIONER
Payer: COMMERCIAL

## 2018-10-06 DIAGNOSIS — S89.91XA: Primary | ICD-10-CM
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
## 2018-10-06 DIAGNOSIS — M17.11: ICD-10-CM

## 2018-10-06 PROCEDURE — 73721 MRI JNT OF LWR EXTRE W/O DYE: CPT

## 2018-10-06 NOTE — DIAGNOSTIC IMAGING REPORT
PROCEDURE: MRI right joint lower extremity without contrast.



TECHNIQUE: Multiplanar, multisequence non contrast-enhanced MRI

of the right lower extremity was accomplished.



INDICATION: Twisting injury with painful swelling.



While I have no previous for direct comparison, study is

interpreted in correlation with plain films dated 09/28/2018.



The anterior posterior cruciate ligaments are well-visualized and

normal. The patellar and quadriceps tendons are intact. The

medial and lateral collateral ligament complex components

crossing the knee joint are intact. There is no bone contusion,

marrow edema, osteochondral injury or fracture pattern. No

evidence for a joint effusion. No loose body is apparent.

Patellar and trochlear articular cartilage is maintained. There

is mild medial greater than lateral tibiofemoral compartmental

Osteoarthritic joint space narrowing and cartilaginous thinning.

There is some intrasubstance degenerative signal involving the

posterior horn medial meniscus and lateral meniscus but no claude

meniscal tear could be identified. The meniscal roots intact. No

subluxation of the menisci. 



IMPRESSION:

Mild arthritis. No fracture or bone contusion. No loose body. No

tendon or ligament rupture and no appreciable meniscal tear.



Dictated by: 



  Dictated on workstation # [id number]